# Patient Record
Sex: MALE | Race: OTHER | NOT HISPANIC OR LATINO | URBAN - METROPOLITAN AREA
[De-identification: names, ages, dates, MRNs, and addresses within clinical notes are randomized per-mention and may not be internally consistent; named-entity substitution may affect disease eponyms.]

---

## 2021-04-19 PROBLEM — Z00.00 ENCOUNTER FOR PREVENTIVE HEALTH EXAMINATION: Status: ACTIVE | Noted: 2021-04-19

## 2021-04-20 ENCOUNTER — OUTPATIENT (OUTPATIENT)
Dept: OUTPATIENT SERVICES | Facility: HOSPITAL | Age: 51
LOS: 1 days | End: 2021-04-20
Payer: COMMERCIAL

## 2021-04-20 ENCOUNTER — APPOINTMENT (OUTPATIENT)
Dept: CARDIOTHORACIC SURGERY | Facility: CLINIC | Age: 51
End: 2021-04-20
Payer: MEDICAID

## 2021-04-20 VITALS
HEIGHT: 69 IN | DIASTOLIC BLOOD PRESSURE: 57 MMHG | BODY MASS INDEX: 24.73 KG/M2 | WEIGHT: 167 LBS | HEART RATE: 81 BPM | OXYGEN SATURATION: 96 % | SYSTOLIC BLOOD PRESSURE: 112 MMHG | TEMPERATURE: 98.2 F | RESPIRATION RATE: 17 BRPM

## 2021-04-20 DIAGNOSIS — Z95.5 PRESENCE OF CORONARY ANGIOPLASTY IMPLANT AND GRAFT: ICD-10-CM

## 2021-04-20 DIAGNOSIS — Z82.49 FAMILY HISTORY OF ISCHEMIC HEART DISEASE AND OTHER DISEASES OF THE CIRCULATORY SYSTEM: ICD-10-CM

## 2021-04-20 DIAGNOSIS — Z86.39 PERSONAL HISTORY OF OTHER ENDOCRINE, NUTRITIONAL AND METABOLIC DISEASE: ICD-10-CM

## 2021-04-20 DIAGNOSIS — Z86.79 PERSONAL HISTORY OF OTHER DISEASES OF THE CIRCULATORY SYSTEM: ICD-10-CM

## 2021-04-20 DIAGNOSIS — I25.10 ATHEROSCLEROTIC HEART DISEASE OF NATIVE CORONARY ARTERY W/OUT ANGINA PECTORIS: ICD-10-CM

## 2021-04-20 LAB
A1C WITH ESTIMATED AVERAGE GLUCOSE RESULT: 8.7 % — HIGH (ref 4–5.6)
ALBUMIN SERPL ELPH-MCNC: 4.5 G/DL — SIGNIFICANT CHANGE UP (ref 3.3–5)
ALP SERPL-CCNC: 73 U/L — SIGNIFICANT CHANGE UP (ref 40–120)
ALT FLD-CCNC: 32 U/L — SIGNIFICANT CHANGE UP (ref 10–45)
ANION GAP SERPL CALC-SCNC: 12 MMOL/L — SIGNIFICANT CHANGE UP (ref 5–17)
APPEARANCE UR: CLEAR — SIGNIFICANT CHANGE UP
APTT BLD: 32.1 SEC — SIGNIFICANT CHANGE UP (ref 27.5–35.5)
AST SERPL-CCNC: 52 U/L — HIGH (ref 10–40)
BASOPHILS # BLD AUTO: 0.04 K/UL — SIGNIFICANT CHANGE UP (ref 0–0.2)
BASOPHILS NFR BLD AUTO: 0.5 % — SIGNIFICANT CHANGE UP (ref 0–2)
BILIRUB SERPL-MCNC: 0.5 MG/DL — SIGNIFICANT CHANGE UP (ref 0.2–1.2)
BILIRUB UR-MCNC: NEGATIVE — SIGNIFICANT CHANGE UP
BLD GP AB SCN SERPL QL: NEGATIVE — SIGNIFICANT CHANGE UP
BUN SERPL-MCNC: 13 MG/DL — SIGNIFICANT CHANGE UP (ref 7–23)
CALCIUM SERPL-MCNC: 10 MG/DL — SIGNIFICANT CHANGE UP (ref 8.4–10.5)
CHLORIDE SERPL-SCNC: 101 MMOL/L — SIGNIFICANT CHANGE UP (ref 96–108)
CHOLEST SERPL-MCNC: 107 MG/DL — SIGNIFICANT CHANGE UP
CO2 SERPL-SCNC: 26 MMOL/L — SIGNIFICANT CHANGE UP (ref 22–31)
COLOR SPEC: YELLOW — SIGNIFICANT CHANGE UP
CREAT SERPL-MCNC: 0.84 MG/DL — SIGNIFICANT CHANGE UP (ref 0.5–1.3)
DIFF PNL FLD: NEGATIVE — SIGNIFICANT CHANGE UP
EOSINOPHIL # BLD AUTO: 0.2 K/UL — SIGNIFICANT CHANGE UP (ref 0–0.5)
EOSINOPHIL NFR BLD AUTO: 2.5 % — SIGNIFICANT CHANGE UP (ref 0–6)
ESTIMATED AVERAGE GLUCOSE: 203 MG/DL — HIGH (ref 68–114)
GLUCOSE SERPL-MCNC: 225 MG/DL — HIGH (ref 70–99)
GLUCOSE UR QL: 500
HBV SURFACE AG SER-ACNC: SIGNIFICANT CHANGE UP
HCT VFR BLD CALC: 45.8 % — SIGNIFICANT CHANGE UP (ref 39–50)
HDLC SERPL-MCNC: 38 MG/DL — LOW
HGB BLD-MCNC: 15 G/DL — SIGNIFICANT CHANGE UP (ref 13–17)
IMM GRANULOCYTES NFR BLD AUTO: 0.3 % — SIGNIFICANT CHANGE UP (ref 0–1.5)
INR BLD: 0.95 — SIGNIFICANT CHANGE UP (ref 0.88–1.16)
KETONES UR-MCNC: NEGATIVE — SIGNIFICANT CHANGE UP
LEUKOCYTE ESTERASE UR-ACNC: NEGATIVE — SIGNIFICANT CHANGE UP
LIPID PNL WITH DIRECT LDL SERPL: 39 MG/DL — SIGNIFICANT CHANGE UP
LYMPHOCYTES # BLD AUTO: 1.51 K/UL — SIGNIFICANT CHANGE UP (ref 1–3.3)
LYMPHOCYTES # BLD AUTO: 19.2 % — SIGNIFICANT CHANGE UP (ref 13–44)
MCHC RBC-ENTMCNC: 28.5 PG — SIGNIFICANT CHANGE UP (ref 27–34)
MCHC RBC-ENTMCNC: 32.8 GM/DL — SIGNIFICANT CHANGE UP (ref 32–36)
MCV RBC AUTO: 87.1 FL — SIGNIFICANT CHANGE UP (ref 80–100)
MONOCYTES # BLD AUTO: 0.77 K/UL — SIGNIFICANT CHANGE UP (ref 0–0.9)
MONOCYTES NFR BLD AUTO: 9.8 % — SIGNIFICANT CHANGE UP (ref 2–14)
NEUTROPHILS # BLD AUTO: 5.34 K/UL — SIGNIFICANT CHANGE UP (ref 1.8–7.4)
NEUTROPHILS NFR BLD AUTO: 67.7 % — SIGNIFICANT CHANGE UP (ref 43–77)
NITRITE UR-MCNC: NEGATIVE — SIGNIFICANT CHANGE UP
NON HDL CHOLESTEROL: 69 MG/DL — SIGNIFICANT CHANGE UP
NRBC # BLD: 0 /100 WBCS — SIGNIFICANT CHANGE UP (ref 0–0)
PH UR: 6.5 — SIGNIFICANT CHANGE UP (ref 5–8)
PLATELET # BLD AUTO: 183 K/UL — SIGNIFICANT CHANGE UP (ref 150–400)
POTASSIUM SERPL-MCNC: 4.1 MMOL/L — SIGNIFICANT CHANGE UP (ref 3.5–5.3)
POTASSIUM SERPL-SCNC: 4.1 MMOL/L — SIGNIFICANT CHANGE UP (ref 3.5–5.3)
PROT SERPL-MCNC: 7.5 G/DL — SIGNIFICANT CHANGE UP (ref 6–8.3)
PROT UR-MCNC: NEGATIVE MG/DL — SIGNIFICANT CHANGE UP
PROTHROM AB SERPL-ACNC: 11.4 SEC — SIGNIFICANT CHANGE UP (ref 10.6–13.6)
RBC # BLD: 5.26 M/UL — SIGNIFICANT CHANGE UP (ref 4.2–5.8)
RBC # FLD: 12.8 % — SIGNIFICANT CHANGE UP (ref 10.3–14.5)
RH IG SCN BLD-IMP: POSITIVE — SIGNIFICANT CHANGE UP
SODIUM SERPL-SCNC: 139 MMOL/L — SIGNIFICANT CHANGE UP (ref 135–145)
SP GR SPEC: 1.02 — SIGNIFICANT CHANGE UP (ref 1–1.03)
TRIGL SERPL-MCNC: 149 MG/DL — SIGNIFICANT CHANGE UP
TSH SERPL-MCNC: 1.92 UIU/ML — SIGNIFICANT CHANGE UP (ref 0.35–4.94)
UROBILINOGEN FLD QL: 0.2 E.U./DL — SIGNIFICANT CHANGE UP
WBC # BLD: 7.88 K/UL — SIGNIFICANT CHANGE UP (ref 3.8–10.5)
WBC # FLD AUTO: 7.88 K/UL — SIGNIFICANT CHANGE UP (ref 3.8–10.5)

## 2021-04-20 PROCEDURE — 80061 LIPID PANEL: CPT

## 2021-04-20 PROCEDURE — 36415 COLL VENOUS BLD VENIPUNCTURE: CPT

## 2021-04-20 PROCEDURE — 87340 HEPATITIS B SURFACE AG IA: CPT

## 2021-04-20 PROCEDURE — 81003 URINALYSIS AUTO W/O SCOPE: CPT

## 2021-04-20 PROCEDURE — 86850 RBC ANTIBODY SCREEN: CPT

## 2021-04-20 PROCEDURE — 93000 ELECTROCARDIOGRAM COMPLETE: CPT

## 2021-04-20 PROCEDURE — 86900 BLOOD TYPING SEROLOGIC ABO: CPT

## 2021-04-20 PROCEDURE — 99244 OFF/OP CNSLTJ NEW/EST MOD 40: CPT

## 2021-04-20 PROCEDURE — 99072 ADDL SUPL MATRL&STAF TM PHE: CPT

## 2021-04-20 PROCEDURE — 85730 THROMBOPLASTIN TIME PARTIAL: CPT

## 2021-04-20 PROCEDURE — 71046 X-RAY EXAM CHEST 2 VIEWS: CPT

## 2021-04-20 PROCEDURE — 85610 PROTHROMBIN TIME: CPT

## 2021-04-20 PROCEDURE — 80053 COMPREHEN METABOLIC PANEL: CPT

## 2021-04-20 PROCEDURE — 71046 X-RAY EXAM CHEST 2 VIEWS: CPT | Mod: 26

## 2021-04-20 PROCEDURE — 83036 HEMOGLOBIN GLYCOSYLATED A1C: CPT

## 2021-04-20 PROCEDURE — 86901 BLOOD TYPING SEROLOGIC RH(D): CPT

## 2021-04-20 PROCEDURE — 85025 COMPLETE CBC W/AUTO DIFF WBC: CPT

## 2021-04-20 PROCEDURE — 84443 ASSAY THYROID STIM HORMONE: CPT

## 2021-04-21 PROBLEM — Z82.49 FAMILY HISTORY OF CORONARY ARTERY DISEASE: Status: ACTIVE | Noted: 2021-04-21

## 2021-04-21 NOTE — HISTORY OF PRESENT ILLNESS
[FreeTextEntry1] : 50 yo male, Jakob/English speaking, presents with PMH of HTN, HLD, DM and family history of premature CAD(brother) with 3 vessel CAD. Patient initially presented in 2014 with class III angina, s/p cardiac cath by that revealed 3 vessel CAD, s/p PCI/TAINA->mid lad. On 2/3/15 s/p stage PCI/TAINA-> mid LCX and mid RCA, patent mid LAD stent. He presented to his cardiologist, Dr. Timmy Reynolds with new onset chest pain at rest and on exertion with accompanying HUBER over the past 2 weeks.  CCS class III. 9/29/20 Echo  revealed normal EF, no valvular disease.  4/9/21 + nuclear stress test. 4/19/21 s/p Cardiac cath that revealed severe 3 vessel with ISR. EF 45%. \par \par He presents today for surgical consult with Dr. Mehta accompanied by his brother. He appears generally well, NAD.

## 2021-04-21 NOTE — DATA REVIEWED
[FreeTextEntry1] : 4/20/21 EKG: SR, 76 bpm\par \par 4/20/21 Chest xray ; clear lungs\par \par 4/19/21 Cardiac cath: \par 3 vessel CAD\par 85% prox LAD, 40% mid LAD ISR, 50% distal LAD\par 75% prox LCX, 50% OM1, 70% OM2, 100% OM3 ISR\par 100%  mid RCA ISR\par \par 9/29/20 Echo: EF 55-65%, no valvular disease\par

## 2021-04-21 NOTE — PHYSICAL EXAM
[General Appearance - Alert] : alert [General Appearance - In No Acute Distress] : in no acute distress [Sclera] : the sclera and conjunctiva were normal [PERRL With Normal Accommodation] : pupils were equal in size, round, and reactive to light [Extraocular Movements] : extraocular movements were intact [Outer Ear] : the ears and nose were normal in appearance [Neck Appearance] : the appearance of the neck was normal [Oropharynx] : the oropharynx was normal [Neck Cervical Mass (___cm)] : no neck mass was observed [Jugular Venous Distention Increased] : there was no jugular-venous distention [Thyroid Diffuse Enlargement] : the thyroid was not enlarged [Thyroid Nodule] : there were no palpable thyroid nodules [Auscultation Breath Sounds / Voice Sounds] : lungs were clear to auscultation bilaterally [Heart Rate And Rhythm] : heart rate was normal and rhythm regular [Heart Sounds] : normal S1 and S2 [Heart Sounds Gallop] : no gallops [Murmurs] : no murmurs [Heart Sounds Pericardial Friction Rub] : no pericardial rub [Examination Of The Chest] : the chest was normal in appearance [Chest Visual Inspection Thoracic Asymmetry] : no chest asymmetry [Diminished Respiratory Excursion] : normal chest expansion [2+] : left 2+ [No Abnormalities] : the abdominal aorta was not enlarged and no bruit was heard [Bowel Sounds] : normal bowel sounds [Abdomen Soft] : soft [Abdomen Tenderness] : non-tender [Abdomen Mass (___ Cm)] : no abdominal mass palpated [No CVA Tenderness] : no ~M costovertebral angle tenderness [No Spinal Tenderness] : no spinal tenderness [Abnormal Walk] : normal gait [Nail Clubbing] : no clubbing  or cyanosis of the fingernails [Musculoskeletal - Swelling] : no joint swelling seen [Motor Tone] : muscle strength and tone were normal [Skin Color & Pigmentation] : normal skin color and pigmentation [Skin Turgor] : normal skin turgor [] : no rash [Deep Tendon Reflexes (DTR)] : deep tendon reflexes were 2+ and symmetric [Sensation] : the sensory exam was normal to light touch and pinprick [No Focal Deficits] : no focal deficits [Oriented To Time, Place, And Person] : oriented to person, place, and time [Impaired Insight] : insight and judgment were intact [Affect] : the affect was normal [Right Carotid Bruit] : no bruit heard over the right carotid [Left Carotid Bruit] : no bruit heard over the left carotid [Right Femoral Bruit] : no bruit heard over the right femoral artery [Left Femoral Bruit] : no bruit heard over the left femoral artery

## 2021-04-21 NOTE — ASSESSMENT
[FreeTextEntry1] : 51 year old male  with a history of HTN, HLD, DM and CAD s/p PCI presents with class III w/ severe 3 vessel CAD. Dr. Mehta reviewed the cardiac cath imaging and reports with the patient and his brother and discussed the case with Dr. Timmy Reynolds.  Dr. Mehta discussed the risks, benefits and alternatives to surgery. Risks include but not limited to death, heart attack, bleeding, stroke, kidney problems and infection. He quoted a 1% operative mortality and complication risk.  He also discussed the various approaches, minimally invasive versus sternotomy. Dr. Mehta feels the patient will benefit and is a candidate for a off pump CABG.  All questions were addressed. He will call us to schedule. \par \par Plan: \par PST today-->labs, EKG, pa/lat chest xray\par covid test within 3 days of admission\par SDA\par patient instructed to take metoprolol on morning of surgery\par instructions provided re antibacterial showers and pt given 3 sponges\par pt instructed on use of the incentive spirometer\par

## 2021-04-21 NOTE — REVIEW OF SYSTEMS
[Chest Pain] : chest pain [SOB on Exertion] : shortness of breath during exertion [Negative] : Heme/Lymph

## 2021-04-21 NOTE — END OF VISIT
[Time Spent: ___ minutes] : I have spent [unfilled] minutes of time on the encounter. [FreeTextEntry3] : I, ADOLPH ODONNELL , am scribing for and in the presence of BUZZ ALLISON the following sections: History of present illness, past Medical/family/surgical/family/social history, review of systems, vital signs, physical exam and disposition.\par I personally performed the services described in the documentation, reviewed the documentation recorded by the scribe in my presence and it accurately and completely records my words and actions.\par

## 2021-04-21 NOTE — CONSULT LETTER
[Dear  ___] : Dear  [unfilled], [Please see my note below.] : Please see my note below. [Sincerely,] : Sincerely, [FreeTextEntry2] : Timmy Reynolds M.D. [FreeTextEntry1] : Please find attached our consultation on your patient, EMILY KENYON \par We take a multidisciplinary team approach to patient care and consider you, the referring physician, an extension of our team. We will maintain an open line of communication with you throughout your patient's treatment course.  \par It is our commitment to provide your patient with the highest quality of advanced therapeutic options. We thank you for allowing us to participate in the care of your patient.\par Please do not hesitate to contact our team with any questions or concerns at [phone].\par  [FreeTextEntry3] : Elliott Mehta MD, FRCS\par \par [facility] \par Department of Cardiothoracic  Surgery \par Director of Robotic Cardiac Surgery\par Professor of Cardiovascular & Thoracic Surgery\par [facility] [facility] [employer] \par \par [facility]\par

## 2021-04-28 ENCOUNTER — NON-APPOINTMENT (OUTPATIENT)
Age: 51
End: 2021-04-28

## 2021-04-29 VITALS
OXYGEN SATURATION: 96 % | SYSTOLIC BLOOD PRESSURE: 112 MMHG | TEMPERATURE: 98 F | HEIGHT: 69 IN | WEIGHT: 166.89 LBS | RESPIRATION RATE: 18 BRPM | HEART RATE: 81 BPM | DIASTOLIC BLOOD PRESSURE: 57 MMHG

## 2021-04-29 NOTE — H&P ADULT - HISTORY OF PRESENT ILLNESS
52 yo male, Jakob/English speaking, presents with PMH of HTN, HLD, DM and family history of premature CAD(brother) with 3 vessel CAD. Patient initially presented in 2014 with class III angina, s/p cardiac cath by that revealed 3 vessel CAD, s/p PCI/TAINA->mid lad. On 2/3/15 s/p stage PCI/TAINA-> mid LCX and mid RCA, patent mid LAD stent. He recently presented to his cardiologist, Dr.Gagandeep Reynolds with new onset chest pain at rest and on exertion with accompanying HUBER over the past 2 weeks. CCS class III. 9/29/20 Echo revealed normal EF, no valvular disease. 4/9/21 + nuclear stress test. 4/19/21 s/p Cardiac cath that revealed severe 3 vessel with ISR. EF 45%.     Patient seen by Dr. Mehta in outpatient office and now presents for elective surgery.    52 yo male, Jakob/English speaking, presents with PMH of HTN, HLD, DM and family history of premature CAD(brother) with 3 vessel CAD. Patient initially presented in 2014 with class III angina, s/p cardiac cath by that revealed 3 vessel CAD, s/p PCI/TAINA->mid lad. On 2/3/15 s/p stage PCI/TAINA-> mid LCX and mid RCA, patent mid LAD stent. He recently presented to his cardiologist, Dr.Gagandeep Reynolds with new onset chest pain at rest and on exertion with accompanying HUBER over the past 2 weeks. CCS class III. 9/29/20 Echo revealed normal EF, no valvular disease. 4/9/21 + nuclear stress test. 4/19/21 s/p Cardiac cath that revealed severe 3 vessel with ISR. EF 45%.     Patient seen by Dr. Mehta in outpatient office and now presents for elective surgery. He took his metoprolol this morning. He has been NPO since yesterday. He has no allergies. Plan to proceed with CABG.

## 2021-04-29 NOTE — H&P ADULT - NSHPLABSRESULTS_GEN_ALL_CORE
Hgb A1C = 8.7  creat = 0.84  hct= 45.8  hgb= 15  plt= 183  WBC= 7.88  INR= 0.95  tot alb= 4.5  tot bili= 0.5    TSH= 1.915    4/20/21 EKG: SR, 76 bpm    4/20/21 Chest xray ; clear lungs    4/19/21 Cardiac cath:   3 vessel CAD  85% prox LAD, 40% mid LAD ISR, 50% distal LAD  75% prox LCX, 50% OM1, 70% OM2, 100% OM3 ISR  100% mid RCA ISR    9/29/20 Echo: EF 55-65%, no valvular disease

## 2021-04-29 NOTE — H&P ADULT - ASSESSMENT
51 year old male with a history of HTN, HLD, DM and CAD s/p PCI presents with class III w/ severe 3 vessel CAD. Dr. Mehta reviewed the cardiac cath imaging and reports with the patient and his brother and discussed the case with Dr. Timmy Reynolds. Dr. Mehta discussed the risks, benefits and alternatives to surgery. Risks include but not limited to death, heart attack, bleeding, stroke, kidney problems and infection. He quoted a 1% operative mortality and complication risk. He also discussed the various approaches, minimally invasive versus sternotomy. Dr. Mehta feels the patient will benefit and is a candidate for off pump CABG. All questions were addressed.     Plan:   PST   covid test 4/30, results in HIE  SDA, 5/3  patient instructed to take metoprolol on morning of surgery  instructions provided re antibacterial showers and pt given 3 sponges  pt instructed on use of the incentive spirometer   51 year old male with a history of HTN, HLD, DM and CAD s/p PCI presents with class III w/ severe 3 vessel CAD. Dr. Mehta reviewed the cardiac cath imaging and reports with the patient and his brother and discussed the case with Dr. Timmy Reynolds. Dr. Mehta discussed the risks, benefits and alternatives to surgery. Risks include but not limited to death, heart attack, bleeding, stroke, kidney problems and infection. He quoted a 1% operative mortality and complication risk. He also discussed the various approaches, minimally invasive versus sternotomy. Dr. Mehta feels the patient will benefit and is a candidate for off pump CABG. All questions were addressed.     Plan:   PST   covid test 4/30, results in HIE  SDA, 5/3  patient instructed to take metoprolol on morning of surgery  instructions provided re antibacterial showers and pt given 3 sponges  pt instructed on use of the incentive spirometer    Plan to proceed with CABG.

## 2021-04-29 NOTE — H&P ADULT - NSICDXPASTMEDICALHX_GEN_ALL_CORE_FT
PAST MEDICAL HISTORY:  Essential hypertension Hypertension    Hyperlipidemia Hyperlipidemia    Presence of stent in LAD coronary artery     Presence of stent in right coronary artery     Type 2 diabetes mellitus Diabetes

## 2021-04-30 ENCOUNTER — APPOINTMENT (OUTPATIENT)
Dept: DISASTER EMERGENCY | Facility: CLINIC | Age: 51
End: 2021-04-30

## 2021-04-30 NOTE — PRE-OP CHECKLIST - SELECT TESTS ORDERED
Left Cardiac Cath, ECHO,/CBC/CMP/PT/PTT/INR/Type and Screen/Urinalysis/EKG/CXR/COVID-19 Left Cardiac Cath, ECHO,/CBC/CMP/PT/PTT/INR/Type and Screen/Urinalysis/EKG/CXR/POCT Blood Glucose/COVID-19

## 2021-05-01 LAB — SARS-COV-2 N GENE NPH QL NAA+PROBE: NOT DETECTED

## 2021-05-02 ENCOUNTER — TRANSCRIPTION ENCOUNTER (OUTPATIENT)
Age: 51
End: 2021-05-02

## 2021-05-03 ENCOUNTER — INPATIENT (INPATIENT)
Facility: HOSPITAL | Age: 51
LOS: 2 days | Discharge: HOME HEALTH | DRG: 236 | End: 2021-05-06
Attending: THORACIC SURGERY (CARDIOTHORACIC VASCULAR SURGERY) | Admitting: THORACIC SURGERY (CARDIOTHORACIC VASCULAR SURGERY)
Payer: COMMERCIAL

## 2021-05-03 ENCOUNTER — APPOINTMENT (OUTPATIENT)
Dept: CARDIOTHORACIC SURGERY | Facility: HOSPITAL | Age: 51
End: 2021-05-03

## 2021-05-03 LAB
ALBUMIN SERPL ELPH-MCNC: 3.2 G/DL — LOW (ref 3.3–5)
ALBUMIN SERPL ELPH-MCNC: 3.6 G/DL — SIGNIFICANT CHANGE UP (ref 3.3–5)
ALBUMIN SERPL ELPH-MCNC: 3.6 G/DL — SIGNIFICANT CHANGE UP (ref 3.3–5)
ALP SERPL-CCNC: 41 U/L — SIGNIFICANT CHANGE UP (ref 40–120)
ALP SERPL-CCNC: 53 U/L — SIGNIFICANT CHANGE UP (ref 40–120)
ALP SERPL-CCNC: 55 U/L — SIGNIFICANT CHANGE UP (ref 40–120)
ALT FLD-CCNC: 13 U/L — SIGNIFICANT CHANGE UP (ref 10–45)
ALT FLD-CCNC: 16 U/L — SIGNIFICANT CHANGE UP (ref 10–45)
ALT FLD-CCNC: 17 U/L — SIGNIFICANT CHANGE UP (ref 10–45)
ANION GAP SERPL CALC-SCNC: 10 MMOL/L — SIGNIFICANT CHANGE UP (ref 5–17)
ANION GAP SERPL CALC-SCNC: 10 MMOL/L — SIGNIFICANT CHANGE UP (ref 5–17)
ANION GAP SERPL CALC-SCNC: 8 MMOL/L — SIGNIFICANT CHANGE UP (ref 5–17)
APTT BLD: 26.7 SEC — LOW (ref 27.5–35.5)
APTT BLD: 27.4 SEC — LOW (ref 27.5–35.5)
APTT BLD: 28.7 SEC — SIGNIFICANT CHANGE UP (ref 27.5–35.5)
AST SERPL-CCNC: 16 U/L — SIGNIFICANT CHANGE UP (ref 10–40)
AST SERPL-CCNC: 16 U/L — SIGNIFICANT CHANGE UP (ref 10–40)
AST SERPL-CCNC: 20 U/L — SIGNIFICANT CHANGE UP (ref 10–40)
BASOPHILS # BLD AUTO: 0.05 K/UL — SIGNIFICANT CHANGE UP (ref 0–0.2)
BASOPHILS NFR BLD AUTO: 0.4 % — SIGNIFICANT CHANGE UP (ref 0–2)
BILIRUB SERPL-MCNC: 0.4 MG/DL — SIGNIFICANT CHANGE UP (ref 0.2–1.2)
BILIRUB SERPL-MCNC: 0.5 MG/DL — SIGNIFICANT CHANGE UP (ref 0.2–1.2)
BILIRUB SERPL-MCNC: 0.7 MG/DL — SIGNIFICANT CHANGE UP (ref 0.2–1.2)
BLD GP AB SCN SERPL QL: NEGATIVE — SIGNIFICANT CHANGE UP
BUN SERPL-MCNC: 8 MG/DL — SIGNIFICANT CHANGE UP (ref 7–23)
BUN SERPL-MCNC: 8 MG/DL — SIGNIFICANT CHANGE UP (ref 7–23)
BUN SERPL-MCNC: 9 MG/DL — SIGNIFICANT CHANGE UP (ref 7–23)
CALCIUM SERPL-MCNC: 8.3 MG/DL — LOW (ref 8.4–10.5)
CALCIUM SERPL-MCNC: 8.5 MG/DL — SIGNIFICANT CHANGE UP (ref 8.4–10.5)
CALCIUM SERPL-MCNC: 8.6 MG/DL — SIGNIFICANT CHANGE UP (ref 8.4–10.5)
CHLORIDE SERPL-SCNC: 101 MMOL/L — SIGNIFICANT CHANGE UP (ref 96–108)
CHLORIDE SERPL-SCNC: 103 MMOL/L — SIGNIFICANT CHANGE UP (ref 96–108)
CHLORIDE SERPL-SCNC: 104 MMOL/L — SIGNIFICANT CHANGE UP (ref 96–108)
CO2 SERPL-SCNC: 24 MMOL/L — SIGNIFICANT CHANGE UP (ref 22–31)
CO2 SERPL-SCNC: 25 MMOL/L — SIGNIFICANT CHANGE UP (ref 22–31)
CO2 SERPL-SCNC: 26 MMOL/L — SIGNIFICANT CHANGE UP (ref 22–31)
CREAT SERPL-MCNC: 0.68 MG/DL — SIGNIFICANT CHANGE UP (ref 0.5–1.3)
CREAT SERPL-MCNC: 0.72 MG/DL — SIGNIFICANT CHANGE UP (ref 0.5–1.3)
CREAT SERPL-MCNC: 0.9 MG/DL — SIGNIFICANT CHANGE UP (ref 0.5–1.3)
EOSINOPHIL # BLD AUTO: 0.13 K/UL — SIGNIFICANT CHANGE UP (ref 0–0.5)
EOSINOPHIL NFR BLD AUTO: 0.9 % — SIGNIFICANT CHANGE UP (ref 0–6)
GAS PNL BLDA: SIGNIFICANT CHANGE UP
GLUCOSE BLDC GLUCOMTR-MCNC: 109 MG/DL — HIGH (ref 70–99)
GLUCOSE BLDC GLUCOMTR-MCNC: 129 MG/DL — HIGH (ref 70–99)
GLUCOSE BLDC GLUCOMTR-MCNC: 166 MG/DL — HIGH (ref 70–99)
GLUCOSE BLDC GLUCOMTR-MCNC: 169 MG/DL — HIGH (ref 70–99)
GLUCOSE BLDC GLUCOMTR-MCNC: 175 MG/DL — HIGH (ref 70–99)
GLUCOSE BLDC GLUCOMTR-MCNC: 177 MG/DL — HIGH (ref 70–99)
GLUCOSE BLDC GLUCOMTR-MCNC: 184 MG/DL — HIGH (ref 70–99)
GLUCOSE BLDC GLUCOMTR-MCNC: 184 MG/DL — HIGH (ref 70–99)
GLUCOSE BLDC GLUCOMTR-MCNC: 188 MG/DL — HIGH (ref 70–99)
GLUCOSE BLDC GLUCOMTR-MCNC: 209 MG/DL — HIGH (ref 70–99)
GLUCOSE BLDC GLUCOMTR-MCNC: 98 MG/DL — SIGNIFICANT CHANGE UP (ref 70–99)
GLUCOSE SERPL-MCNC: 121 MG/DL — HIGH (ref 70–99)
GLUCOSE SERPL-MCNC: 181 MG/DL — HIGH (ref 70–99)
GLUCOSE SERPL-MCNC: 216 MG/DL — HIGH (ref 70–99)
HCT VFR BLD CALC: 30.2 % — LOW (ref 39–50)
HCT VFR BLD CALC: 36.3 % — LOW (ref 39–50)
HCT VFR BLD CALC: 36.8 % — LOW (ref 39–50)
HGB BLD-MCNC: 10.3 G/DL — LOW (ref 13–17)
HGB BLD-MCNC: 12.3 G/DL — LOW (ref 13–17)
HGB BLD-MCNC: 12.4 G/DL — LOW (ref 13–17)
IMM GRANULOCYTES NFR BLD AUTO: 0.7 % — SIGNIFICANT CHANGE UP (ref 0–1.5)
INR BLD: 1.11 — SIGNIFICANT CHANGE UP (ref 0.88–1.16)
INR BLD: 1.19 — HIGH (ref 0.88–1.16)
INR BLD: 1.22 — HIGH (ref 0.88–1.16)
LYMPHOCYTES # BLD AUTO: 1.43 K/UL — SIGNIFICANT CHANGE UP (ref 1–3.3)
LYMPHOCYTES # BLD AUTO: 10.3 % — LOW (ref 13–44)
MAGNESIUM SERPL-MCNC: 1.3 MG/DL — LOW (ref 1.6–2.6)
MAGNESIUM SERPL-MCNC: 1.8 MG/DL — SIGNIFICANT CHANGE UP (ref 1.6–2.6)
MAGNESIUM SERPL-MCNC: 2.5 MG/DL — SIGNIFICANT CHANGE UP (ref 1.6–2.6)
MCHC RBC-ENTMCNC: 29 PG — SIGNIFICANT CHANGE UP (ref 27–34)
MCHC RBC-ENTMCNC: 29.1 PG — SIGNIFICANT CHANGE UP (ref 27–34)
MCHC RBC-ENTMCNC: 29.4 PG — SIGNIFICANT CHANGE UP (ref 27–34)
MCHC RBC-ENTMCNC: 33.7 GM/DL — SIGNIFICANT CHANGE UP (ref 32–36)
MCHC RBC-ENTMCNC: 33.9 GM/DL — SIGNIFICANT CHANGE UP (ref 32–36)
MCHC RBC-ENTMCNC: 34.1 GM/DL — SIGNIFICANT CHANGE UP (ref 32–36)
MCV RBC AUTO: 86 FL — SIGNIFICANT CHANGE UP (ref 80–100)
MCV RBC AUTO: 86 FL — SIGNIFICANT CHANGE UP (ref 80–100)
MCV RBC AUTO: 86.3 FL — SIGNIFICANT CHANGE UP (ref 80–100)
MONOCYTES # BLD AUTO: 1.13 K/UL — HIGH (ref 0–0.9)
MONOCYTES NFR BLD AUTO: 8.1 % — SIGNIFICANT CHANGE UP (ref 2–14)
NEUTROPHILS # BLD AUTO: 11.09 K/UL — HIGH (ref 1.8–7.4)
NEUTROPHILS NFR BLD AUTO: 79.6 % — HIGH (ref 43–77)
NRBC # BLD: 0 /100 WBCS — SIGNIFICANT CHANGE UP (ref 0–0)
PHOSPHATE SERPL-MCNC: 3 MG/DL — SIGNIFICANT CHANGE UP (ref 2.5–4.5)
PHOSPHATE SERPL-MCNC: 4 MG/DL — SIGNIFICANT CHANGE UP (ref 2.5–4.5)
PHOSPHATE SERPL-MCNC: 4.9 MG/DL — HIGH (ref 2.5–4.5)
PLATELET # BLD AUTO: 137 K/UL — LOW (ref 150–400)
PLATELET # BLD AUTO: 148 K/UL — LOW (ref 150–400)
PLATELET # BLD AUTO: 156 K/UL — SIGNIFICANT CHANGE UP (ref 150–400)
POTASSIUM SERPL-MCNC: 4 MMOL/L — SIGNIFICANT CHANGE UP (ref 3.5–5.3)
POTASSIUM SERPL-MCNC: 4.2 MMOL/L — SIGNIFICANT CHANGE UP (ref 3.5–5.3)
POTASSIUM SERPL-MCNC: 4.3 MMOL/L — SIGNIFICANT CHANGE UP (ref 3.5–5.3)
POTASSIUM SERPL-SCNC: 4 MMOL/L — SIGNIFICANT CHANGE UP (ref 3.5–5.3)
POTASSIUM SERPL-SCNC: 4.2 MMOL/L — SIGNIFICANT CHANGE UP (ref 3.5–5.3)
POTASSIUM SERPL-SCNC: 4.3 MMOL/L — SIGNIFICANT CHANGE UP (ref 3.5–5.3)
PROT SERPL-MCNC: 5.2 G/DL — LOW (ref 6–8.3)
PROT SERPL-MCNC: 5.5 G/DL — LOW (ref 6–8.3)
PROT SERPL-MCNC: 5.7 G/DL — LOW (ref 6–8.3)
PROTHROM AB SERPL-ACNC: 13.2 SEC — SIGNIFICANT CHANGE UP (ref 10.6–13.6)
PROTHROM AB SERPL-ACNC: 14.2 SEC — HIGH (ref 10.6–13.6)
PROTHROM AB SERPL-ACNC: 14.5 SEC — HIGH (ref 10.6–13.6)
RBC # BLD: 3.5 M/UL — LOW (ref 4.2–5.8)
RBC # BLD: 4.22 M/UL — SIGNIFICANT CHANGE UP (ref 4.2–5.8)
RBC # BLD: 4.28 M/UL — SIGNIFICANT CHANGE UP (ref 4.2–5.8)
RBC # FLD: 12.2 % — SIGNIFICANT CHANGE UP (ref 10.3–14.5)
RBC # FLD: 12.3 % — SIGNIFICANT CHANGE UP (ref 10.3–14.5)
RBC # FLD: 12.5 % — SIGNIFICANT CHANGE UP (ref 10.3–14.5)
RH IG SCN BLD-IMP: POSITIVE — SIGNIFICANT CHANGE UP
SODIUM SERPL-SCNC: 135 MMOL/L — SIGNIFICANT CHANGE UP (ref 135–145)
SODIUM SERPL-SCNC: 138 MMOL/L — SIGNIFICANT CHANGE UP (ref 135–145)
SODIUM SERPL-SCNC: 138 MMOL/L — SIGNIFICANT CHANGE UP (ref 135–145)
WBC # BLD: 12.01 K/UL — HIGH (ref 3.8–10.5)
WBC # BLD: 13.93 K/UL — HIGH (ref 3.8–10.5)
WBC # BLD: 9.18 K/UL — SIGNIFICANT CHANGE UP (ref 3.8–10.5)
WBC # FLD AUTO: 12.01 K/UL — HIGH (ref 3.8–10.5)
WBC # FLD AUTO: 13.93 K/UL — HIGH (ref 3.8–10.5)
WBC # FLD AUTO: 9.18 K/UL — SIGNIFICANT CHANGE UP (ref 3.8–10.5)

## 2021-05-03 PROCEDURE — 99291 CRITICAL CARE FIRST HOUR: CPT

## 2021-05-03 PROCEDURE — 99292 CRITICAL CARE ADDL 30 MIN: CPT

## 2021-05-03 PROCEDURE — 33534 CABG ARTERIAL TWO: CPT | Mod: AS

## 2021-05-03 PROCEDURE — 76998 US GUIDE INTRAOP: CPT | Mod: 26,59

## 2021-05-03 PROCEDURE — 33534 CABG ARTERIAL TWO: CPT

## 2021-05-03 PROCEDURE — 71045 X-RAY EXAM CHEST 1 VIEW: CPT | Mod: 26,77

## 2021-05-03 PROCEDURE — 76998 US GUIDE INTRAOP: CPT | Mod: 26,AS,59

## 2021-05-03 PROCEDURE — 71045 X-RAY EXAM CHEST 1 VIEW: CPT | Mod: 26

## 2021-05-03 PROCEDURE — 93010 ELECTROCARDIOGRAM REPORT: CPT

## 2021-05-03 RX ORDER — INSULIN HUMAN 100 [IU]/ML
1 INJECTION, SOLUTION SUBCUTANEOUS
Qty: 50 | Refills: 0 | Status: DISCONTINUED | OUTPATIENT
Start: 2021-05-03 | End: 2021-05-04

## 2021-05-03 RX ORDER — ALBUMIN HUMAN 25 %
250 VIAL (ML) INTRAVENOUS
Refills: 0 | Status: COMPLETED | OUTPATIENT
Start: 2021-05-03 | End: 2021-05-03

## 2021-05-03 RX ORDER — CHLORHEXIDINE GLUCONATE 213 G/1000ML
1 SOLUTION TOPICAL DAILY
Refills: 0 | Status: DISCONTINUED | OUTPATIENT
Start: 2021-05-03 | End: 2021-05-04

## 2021-05-03 RX ORDER — CLOPIDOGREL BISULFATE 75 MG/1
75 TABLET, FILM COATED ORAL DAILY
Refills: 0 | Status: DISCONTINUED | OUTPATIENT
Start: 2021-05-03 | End: 2021-05-06

## 2021-05-03 RX ORDER — MAGNESIUM SULFATE 500 MG/ML
2 VIAL (ML) INJECTION ONCE
Refills: 0 | Status: COMPLETED | OUTPATIENT
Start: 2021-05-03 | End: 2021-05-03

## 2021-05-03 RX ORDER — ATORVASTATIN CALCIUM 80 MG/1
80 TABLET, FILM COATED ORAL AT BEDTIME
Refills: 0 | Status: DISCONTINUED | OUTPATIENT
Start: 2021-05-03 | End: 2021-05-06

## 2021-05-03 RX ORDER — OXYCODONE AND ACETAMINOPHEN 5; 325 MG/1; MG/1
2 TABLET ORAL EVERY 6 HOURS
Refills: 0 | Status: DISCONTINUED | OUTPATIENT
Start: 2021-05-03 | End: 2021-05-06

## 2021-05-03 RX ORDER — DEXTROSE 50 % IN WATER 50 %
25 SYRINGE (ML) INTRAVENOUS
Refills: 0 | Status: DISCONTINUED | OUTPATIENT
Start: 2021-05-03 | End: 2021-05-04

## 2021-05-03 RX ORDER — POLYETHYLENE GLYCOL 3350 17 G/17G
17 POWDER, FOR SOLUTION ORAL DAILY
Refills: 0 | Status: DISCONTINUED | OUTPATIENT
Start: 2021-05-03 | End: 2021-05-06

## 2021-05-03 RX ORDER — PANTOPRAZOLE SODIUM 20 MG/1
40 TABLET, DELAYED RELEASE ORAL DAILY
Refills: 0 | Status: DISCONTINUED | OUTPATIENT
Start: 2021-05-03 | End: 2021-05-03

## 2021-05-03 RX ORDER — DEXMEDETOMIDINE HYDROCHLORIDE IN 0.9% SODIUM CHLORIDE 4 UG/ML
0.5 INJECTION INTRAVENOUS
Qty: 400 | Refills: 0 | Status: DISCONTINUED | OUTPATIENT
Start: 2021-05-03 | End: 2021-05-03

## 2021-05-03 RX ORDER — CHLORHEXIDINE GLUCONATE 213 G/1000ML
5 SOLUTION TOPICAL
Refills: 0 | Status: DISCONTINUED | OUTPATIENT
Start: 2021-05-03 | End: 2021-05-03

## 2021-05-03 RX ORDER — CEFAZOLIN SODIUM 1 G
2000 VIAL (EA) INJECTION EVERY 8 HOURS
Refills: 0 | Status: COMPLETED | OUTPATIENT
Start: 2021-05-03 | End: 2021-05-04

## 2021-05-03 RX ORDER — CALCIUM GLUCONATE 100 MG/ML
2 VIAL (ML) INTRAVENOUS ONCE
Refills: 0 | Status: COMPLETED | OUTPATIENT
Start: 2021-05-03 | End: 2021-05-03

## 2021-05-03 RX ORDER — HEPARIN SODIUM 5000 [USP'U]/ML
5000 INJECTION INTRAVENOUS; SUBCUTANEOUS EVERY 8 HOURS
Refills: 0 | Status: DISCONTINUED | OUTPATIENT
Start: 2021-05-03 | End: 2021-05-06

## 2021-05-03 RX ORDER — ACETAMINOPHEN 500 MG
1000 TABLET ORAL ONCE
Refills: 0 | Status: COMPLETED | OUTPATIENT
Start: 2021-05-03 | End: 2021-05-03

## 2021-05-03 RX ORDER — ACETAMINOPHEN 500 MG
650 TABLET ORAL EVERY 6 HOURS
Refills: 0 | Status: DISCONTINUED | OUTPATIENT
Start: 2021-05-03 | End: 2021-05-06

## 2021-05-03 RX ORDER — OXYCODONE AND ACETAMINOPHEN 5; 325 MG/1; MG/1
1 TABLET ORAL EVERY 4 HOURS
Refills: 0 | Status: DISCONTINUED | OUTPATIENT
Start: 2021-05-03 | End: 2021-05-06

## 2021-05-03 RX ORDER — FENTANYL CITRATE 50 UG/ML
50 INJECTION INTRAVENOUS ONCE
Refills: 0 | Status: DISCONTINUED | OUTPATIENT
Start: 2021-05-03 | End: 2021-05-03

## 2021-05-03 RX ORDER — PANTOPRAZOLE SODIUM 20 MG/1
40 TABLET, DELAYED RELEASE ORAL
Refills: 0 | Status: DISCONTINUED | OUTPATIENT
Start: 2021-05-03 | End: 2021-05-06

## 2021-05-03 RX ORDER — MEPERIDINE HYDROCHLORIDE 50 MG/ML
25 INJECTION INTRAMUSCULAR; INTRAVENOUS; SUBCUTANEOUS ONCE
Refills: 0 | Status: DISCONTINUED | OUTPATIENT
Start: 2021-05-03 | End: 2021-05-03

## 2021-05-03 RX ORDER — DEXTROSE 50 % IN WATER 50 %
50 SYRINGE (ML) INTRAVENOUS
Refills: 0 | Status: DISCONTINUED | OUTPATIENT
Start: 2021-05-03 | End: 2021-05-04

## 2021-05-03 RX ORDER — FENTANYL CITRATE 50 UG/ML
25 INJECTION INTRAVENOUS ONCE
Refills: 0 | Status: DISCONTINUED | OUTPATIENT
Start: 2021-05-03 | End: 2021-05-03

## 2021-05-03 RX ORDER — NOREPINEPHRINE BITARTRATE/D5W 8 MG/250ML
0.05 PLASTIC BAG, INJECTION (ML) INTRAVENOUS
Qty: 8 | Refills: 0 | Status: DISCONTINUED | OUTPATIENT
Start: 2021-05-03 | End: 2021-05-04

## 2021-05-03 RX ORDER — CHLORHEXIDINE GLUCONATE 213 G/1000ML
15 SOLUTION TOPICAL EVERY 12 HOURS
Refills: 0 | Status: DISCONTINUED | OUTPATIENT
Start: 2021-05-03 | End: 2021-05-03

## 2021-05-03 RX ORDER — SODIUM CHLORIDE 9 MG/ML
1000 INJECTION INTRAMUSCULAR; INTRAVENOUS; SUBCUTANEOUS
Refills: 0 | Status: DISCONTINUED | OUTPATIENT
Start: 2021-05-03 | End: 2021-05-04

## 2021-05-03 RX ORDER — ASPIRIN/CALCIUM CARB/MAGNESIUM 324 MG
81 TABLET ORAL DAILY
Refills: 0 | Status: DISCONTINUED | OUTPATIENT
Start: 2021-05-03 | End: 2021-05-06

## 2021-05-03 RX ORDER — PROPOFOL 10 MG/ML
10 INJECTION, EMULSION INTRAVENOUS
Qty: 1000 | Refills: 0 | Status: DISCONTINUED | OUTPATIENT
Start: 2021-05-03 | End: 2021-05-03

## 2021-05-03 RX ORDER — CEFAZOLIN SODIUM 1 G
2000 VIAL (EA) INJECTION EVERY 8 HOURS
Refills: 0 | Status: DISCONTINUED | OUTPATIENT
Start: 2021-05-03 | End: 2021-05-03

## 2021-05-03 RX ADMIN — Medication 200 GRAM(S): at 18:00

## 2021-05-03 RX ADMIN — INSULIN HUMAN 1 UNIT(S)/HR: 100 INJECTION, SOLUTION SUBCUTANEOUS at 12:57

## 2021-05-03 RX ADMIN — Medication 50 GRAM(S): at 23:09

## 2021-05-03 RX ADMIN — PANTOPRAZOLE SODIUM 40 MILLIGRAM(S): 20 TABLET, DELAYED RELEASE ORAL at 12:56

## 2021-05-03 RX ADMIN — Medication 400 MILLIGRAM(S): at 18:19

## 2021-05-03 RX ADMIN — FENTANYL CITRATE 25 MICROGRAM(S): 50 INJECTION INTRAVENOUS at 12:15

## 2021-05-03 RX ADMIN — CHLORHEXIDINE GLUCONATE 1 APPLICATION(S): 213 SOLUTION TOPICAL at 12:57

## 2021-05-03 RX ADMIN — Medication 2000 MILLIGRAM(S): at 21:24

## 2021-05-03 RX ADMIN — Medication 50 GRAM(S): at 12:56

## 2021-05-03 RX ADMIN — Medication 250 MILLILITER(S): at 21:28

## 2021-05-03 RX ADMIN — OXYCODONE AND ACETAMINOPHEN 2 TABLET(S): 5; 325 TABLET ORAL at 22:31

## 2021-05-03 RX ADMIN — Medication 250 MILLILITER(S): at 21:29

## 2021-05-03 RX ADMIN — Medication 200 GRAM(S): at 22:38

## 2021-05-03 RX ADMIN — OXYCODONE AND ACETAMINOPHEN 2 TABLET(S): 5; 325 TABLET ORAL at 23:00

## 2021-05-03 RX ADMIN — Medication 2000 MILLIGRAM(S): at 16:16

## 2021-05-03 RX ADMIN — Medication 1000 MILLIGRAM(S): at 19:05

## 2021-05-03 RX ADMIN — HEPARIN SODIUM 5000 UNIT(S): 5000 INJECTION INTRAVENOUS; SUBCUTANEOUS at 14:21

## 2021-05-03 RX ADMIN — FENTANYL CITRATE 50 MICROGRAM(S): 50 INJECTION INTRAVENOUS at 20:00

## 2021-05-03 RX ADMIN — FENTANYL CITRATE 25 MICROGRAM(S): 50 INJECTION INTRAVENOUS at 12:00

## 2021-05-03 RX ADMIN — FENTANYL CITRATE 50 MICROGRAM(S): 50 INJECTION INTRAVENOUS at 19:45

## 2021-05-03 RX ADMIN — DEXMEDETOMIDINE HYDROCHLORIDE IN 0.9% SODIUM CHLORIDE 9.46 MICROGRAM(S)/KG/HR: 4 INJECTION INTRAVENOUS at 12:57

## 2021-05-03 RX ADMIN — ATORVASTATIN CALCIUM 80 MILLIGRAM(S): 80 TABLET, FILM COATED ORAL at 21:18

## 2021-05-03 NOTE — BRIEF OPERATIVE NOTE - OPERATION/FINDINGS
OPCABx2 (LIMA to LAD, RA to OM)  EF 50% OPCABx2 (LIMA to LAD, RA to OM)  Proximal anastomoses with side biter and 2-0 punch  EF 50%

## 2021-05-03 NOTE — BRIEF OPERATIVE NOTE - NSICDXBRIEFPROCEDURE_GEN_ALL_CORE_FT
PROCEDURES:  OPCAB (off-pump coronary artery bypass) 03-May-2021 11:31:13 HAAS to LAD, RA to Roxie Bryson

## 2021-05-03 NOTE — PROGRESS NOTE ADULT - SUBJECTIVE AND OBJECTIVE BOX
CTICU  CRITICAL  CARE  attending     Hand off received 					   Pertinent clinical, laboratory, radiographic, hemodynamic, echocardiographic, respiratory data, microbiologic data and chart were reviewed and analyzed frequently throughout the course of the day and night  Patient seen and examined with CTS/ SH attending at bedside    operative day today: CABG x 2V; LIMA; and Left Radial art graft; Off pump  EF reportedly normal  IVF: 3L crystalloid    received intubated/sedated  ETT adjusted at lip line  Waking up non focal  Labs/CXR reviewed and appropriate changes made  Weaning to extubate    HPI:    52 yo male, Jakob/English speaking, presents with PMH of HTN, HLD, DM and family history of premature CAD(brother) with 3 vessel CAD. Patient initially presented in 2014 with class III angina, s/p cardiac cath by that revealed 3 vessel CAD, s/p PCI/TAINA->mid lad. On 2/3/15 s/p stage PCI/TAINA-> mid LCX and mid RCA, patent mid LAD stent. He recently presented to his cardiologist, Dr.Gagandeep Reynolds with new onset chest pain at rest and on exertion with accompanying HUBER over the past 2 weeks. CCS class III. 9/29/20 Echo revealed normal EF, no valvular disease. 4/9/21 + nuclear stress test. 4/19/21 s/p Cardiac cath that revealed severe 3 vessel with ISR. EF 45%.         , FAMILY HISTORY:  Family history of cardiovascular disease  Family history of heart disease    PAST MEDICAL & SURGICAL HISTORY:  Type 2 diabetes mellitus  Diabetes    Hyperlipidemia  Hyperlipidemia    Essential hypertension  Hypertension    Presence of stent in LAD coronary artery    Presence of stent in right coronary artery      Patient is a 51y old  Male who presents with a chief complaint of CAD (29 Apr 2021 16:34)      14 system review was unremarkable    Vital signs, hemodynamic and respiratory parameters were reviewed from the bedside nursing flowsheet.  ICU Vital Signs Last 24 Hrs  T(C): --  T(F): --  HR: 86 (03 May 2021 13:15) (86 - 104)  BP: --  BP(mean): --  ABP: 131/63 (03 May 2021 13:15) (120/58 - 159/67)  ABP(mean): 84 (03 May 2021 13:15) (80 - 98)  RR: 12 (03 May 2021 13:30) (12 - 12)  SpO2: 100% (03 May 2021 13:15) (100% - 100%)    Adult Advanced Hemodynamics Last 24 Hrs  CVP(mm Hg): 4 (03 May 2021 13:00) (2 - 7)  CVP(cm H2O): --  CO: --  CI: --  PA: --  PA(mean): --  PCWP: --  SVR: --  SVRI: --  PVR: --  PVRI: --, ABG - ( 03 May 2021 11:37 )  pH, Arterial: 7.43  pH, Blood: x     /  pCO2: 35    /  pO2: 320   / HCO3: 23    / Base Excess: -0.7  /  SaO2: 98.3              Mode: AC/ CMV (Assist Control/ Continuous Mandatory Ventilation)  RR (machine): 12  TV (machine): 500  FiO2: 40  PEEP: 5  ITime: 1  MAP: 8  PIP: 18    Intake and output was reviewed and the fluid balance was calculated  Daily     Daily   I&O's Summary    03 May 2021 07:01  -  03 May 2021 13:24  --------------------------------------------------------  IN: 110.2 mL / OUT: 375 mL / NET: -264.8 mL        All lines and drain sites were assessed  [facility]      POCT Blood Glucose.: 166 mg/dL (03 May 2021 05:57)    No acute change in mental status  Auscultation of the chest reveals equal bs  Abdomen is soft  Extremities are warm and well perfused  Wounds appear clean and unremarkable  Antibiotics are periop    labs  CBC Full  -  ( 03 May 2021 12:11 )  WBC Count : 13.93 K/uL  RBC Count : 4.22 M/uL  Hemoglobin : 12.3 g/dL  Hematocrit : 36.3 %  Platelet Count - Automated : 148 K/uL  Mean Cell Volume : 86.0 fl  Mean Cell Hemoglobin : 29.1 pg  Mean Cell Hemoglobin Concentration : 33.9 gm/dL  Auto Neutrophil # : 11.09 K/uL  Auto Lymphocyte # : 1.43 K/uL  Auto Monocyte # : 1.13 K/uL  Auto Eosinophil # : 0.13 K/uL  Auto Basophil # : 0.05 K/uL  Auto Neutrophil % : 79.6 %  Auto Lymphocyte % : 10.3 %  Auto Monocyte % : 8.1 %  Auto Eosinophil % : 0.9 %  Auto Basophil % : 0.4 %    05-03    138  |  104  |  8   ----------------------------<  181<H>  4.3   |  24  |  0.68    Ca    8.5      03 May 2021 12:11  Phos  3.0     05-03  Mg     1.3     05-03    TPro  5.2<L>  /  Alb  3.2<L>  /  TBili  0.5  /  DBili  x   /  AST  16  /  ALT  16  /  AlkPhos  55  05-03    PT/INR - ( 03 May 2021 12:11 )   PT: 14.5 sec;   INR: 1.22          PTT - ( 03 May 2021 12:11 )  PTT:26.7 sec  The current medications were reviewed   MEDICATIONS  (STANDING):  aspirin enteric coated 81 milliGRAM(s) Oral daily  atorvastatin 80 milliGRAM(s) Oral at bedtime  ceFAZolin  Injectable. 2000 milliGRAM(s) IV Push every 8 hours  chlorhexidine 0.12% Liquid 5 milliLiter(s) Oral Mucosa two times a day  chlorhexidine 2% Cloths 1 Application(s) Topical daily  clopidogrel Tablet 75 milliGRAM(s) Oral daily  dexMEDEtomidine Infusion 0.5 MICROgram(s)/kG/Hr (9.46 mL/Hr) IV Continuous <Continuous>  dextrose 50% Injectable 50 milliLiter(s) IV Push every 15 minutes  dextrose 50% Injectable 25 milliLiter(s) IV Push every 15 minutes  heparin   Injectable 5000 Unit(s) SubCutaneous every 8 hours  insulin regular Infusion 1 Unit(s)/Hr (1 mL/Hr) IV Continuous <Continuous>  meperidine     Injectable 25 milliGRAM(s) IV Push once  norepinephrine Infusion 0.05 MICROgram(s)/kG/Min (7.09 mL/Hr) IV Continuous <Continuous>  pantoprazole  Injectable 40 milliGRAM(s) IV Push daily  propofol Infusion 10 MICROgram(s)/kG/Min (4.54 mL/Hr) IV Continuous <Continuous>  sodium chloride 0.9%. 1000 milliLiter(s) (10 mL/Hr) IV Continuous <Continuous>    MEDICATIONS  (PRN):       PROBLEM LIST/ ASSESSMENT:  HEALTH ISSUES - PROBLEM Dx:      ,   Patient is a 51y old  Male who presents with a chief complaint of CAD (29 Apr 2021 16:34)     s/p CABG      My plan includes :    CV:    maintain sinus rhythm  maintain MAP >60 but less than 80  Epicardial pacemaker on VVI as backup    Pulm:    stable on the vent  serial ABG's to assess adequacy of ventilation/oxygenation and readiness for weaning and extubation      close hemodynamic, ventilatory and drain monitoring and management per post op routine    Monitor for arrhythmias and monitor parameters for organ perfusion  monitor neurologic status  Head of the bed should remain elevated to 45 deg .   chest PT and IS will be encouraged  monitor adequacy of oxygenation and ventilation and attempt to wean oxygen  Nutritional goals will be met using po eventually , ensure adequate caloric intake and montior the same  Stress ulcer and VTE prophylaxis will be achieved    Glycemic control is satisfactory  Electrolytes have been repleted as necessary and wound care has been carried out. Pain control has been achieved.   agressive physical therapy and early mobility and ambulation goals will be met   The family was updated about the course and plan  CRITICAL CARE TIME SPENT in evaluation and management, reassessments, review and interpretation of labs and x-rays, ventilator and hemodynamic management, formulating a plan and coordinating care: ___90____ MIN.  Time does not include procedural time.  CTICU ATTENDING     					    Memo Schultz MD

## 2021-05-03 NOTE — BRIEF OPERATIVE NOTE - COMMENTS
I first assisted for the entirety of the case, including but not limited to conduit harvest, distal/proximal anastamoses, and chest closure.

## 2021-05-03 NOTE — PROGRESS NOTE ADULT - SUBJECTIVE AND OBJECTIVE BOX
CTICU  CRITICAL  CARE  attending     Hand off received 					   Pertinent clinical, laboratory, radiographic, hemodynamic, echocardiographic, respiratory data, microbiologic data and chart were reviewed and analyzed frequently throughout the course of the day and night    51 years old male with DM, HTN, HLD,  and family history of premature CAD(brother) with 3 vessel CAD.   The patient initially diagnosed  in 2014 with  3 vessel CAD, s/p PCI/TAINA->mid lad. On 2/3/15 s/p stage PCI/TAINA-> mid LCX and mid RCA, patent mid LAD stent.   He recently presented to his cardiologist, Dr.Gagandeep Reynolds with new onset chest pain at rest and on exertion with accompanying HUBER over the past 2 weeks.  Echo revealed normal EF, no valvular disease. 4/9/21 + nuclear stress test.   Cardiac cath that revealed severe 3 vessel with ISR. EF 45%.     S/P Off Pump CABG x 2.      FAMILY HISTORY:  Family history of cardiovascular disease  Family history of heart disease    PAST MEDICAL & SURGICAL HISTORY:  Type 2 Diabetes Mellitis.  Hyperlipidemia  Essential hypertension  Presence of stent in LAD coronary artery  Presence of stent in right coronary artery          14 system review was unremarkable      Vital signs, hemodynamic and respiratory parameters were reviewed from the bedside nursing flow sheet.  ICU Vital Signs Last 24 Hrs  T(C): 36.7 (03 May 2021 22:42), Max: 36.7 (03 May 2021 22:42)  T(F): 98 (03 May 2021 22:42), Max: 98 (03 May 2021 22:42)  HR: 103 (03 May 2021 23:00) (76 - 104)  BP: 101/57 (03 May 2021 17:00) (101/57 - 101/57)  BP(mean): 73 (03 May 2021 17:00) (73 - 73)  ABP: 118/52 (03 May 2021 23:00) (100/54 - 159/67)  ABP(mean): 71 (03 May 2021 23:00) (69 - 98)  RR: 16 (03 May 2021 23:00) (12 - 18)  SpO2: 100% (03 May 2021 23:00) (98% - 100%)    Adult Advanced Hemodynamics Last 24 Hrs  CVP(mm Hg): 4 (03 May 2021 23:00) (1 - 11)  CVP(cm H2O): --  CO: --  CI: --  PA: --  PA(mean): --  PCWP: --  SVR: --  SVRI: --  PVR: --  PVRI: --, ABG - ( 03 May 2021 21:42 )  pH, Arterial: 7.37  pH, Blood: x     /  pCO2: 41    /  pO2: 161   / HCO3: 24    / Base Excess: -1.5  /  SaO2: 98.4              Mode: AC/ CMV (Assist Control/ Continuous Mandatory Ventilation)  RR (machine): 12  TV (machine): 500  FiO2: 40  PEEP: 5  ITime: 1  MAP: 8  PIP: 18    Intake and output was reviewed and the fluid balance was calculated  Daily     Daily   I&O's Summary    03 May 2021 07:01  -  03 May 2021 23:38  --------------------------------------------------------  IN: 2439.2 mL / OUT: 1715 mL / NET: 724.2 mL        All lines and drain sites were assessed    Neuro: Follows commands. Moves all 4 extremities.  Neck: No JVD.  CVS: S1, S2, No S3.  Lungs: Good air entry bilaterally.  Abd: Soft. No tenderness. + Bowel sounds.  Vascular: + DP/PT.  Extremities: No edema.  Lymphatic: Normal.  Skin: No abnormalities.      labs  CBC Full  -  ( 03 May 2021 21:39 )  WBC Count : 9.18 K/uL  RBC Count : 3.50 M/uL  Hemoglobin : 10.3 g/dL  Hematocrit : 30.2 %  Platelet Count - Automated : 137 K/uL  Mean Cell Volume : 86.3 fl  Mean Cell Hemoglobin : 29.4 pg  Mean Cell Hemoglobin Concentration : 34.1 gm/dL  Auto Neutrophil # : x  Auto Lymphocyte # : x  Auto Monocyte # : x  Auto Eosinophil # : x  Auto Basophil # : x  Auto Neutrophil % : x  Auto Lymphocyte % : x  Auto Monocyte % : x  Auto Eosinophil % : x  Auto Basophil % : x    05-03    135  |  101  |  9   ----------------------------<  216<H>  4.2   |  26  |  0.90    Ca    8.3<L>      03 May 2021 21:39  Phos  4.9     05-03  Mg     1.8     05-03    TPro  5.5<L>  /  Alb  3.6  /  TBili  0.7  /  DBili  x   /  AST  16  /  ALT  13  /  AlkPhos  41  05-03    PT/INR - ( 03 May 2021 21:39 )   PT: 14.2 sec;   INR: 1.19          PTT - ( 03 May 2021 21:39 )  PTT:28.7 sec  The current medications were reviewed   MEDICATIONS  (STANDING):  aspirin enteric coated 81 milliGRAM(s) Oral daily  atorvastatin 80 milliGRAM(s) Oral at bedtime  ceFAZolin  Injectable. 2000 milliGRAM(s) IV Push every 8 hours  chlorhexidine 2% Cloths 1 Application(s) Topical daily  clopidogrel Tablet 75 milliGRAM(s) Oral daily  dextrose 50% Injectable 50 milliLiter(s) IV Push every 15 minutes  dextrose 50% Injectable 25 milliLiter(s) IV Push every 15 minutes  heparin   Injectable 5000 Unit(s) SubCutaneous every 8 hours  insulin regular Infusion 1 Unit(s)/Hr (1 mL/Hr) IV Continuous <Continuous>  norepinephrine Infusion 0.05 MICROgram(s)/kG/Min (7.09 mL/Hr) IV Continuous <Continuous>  pantoprazole    Tablet 40 milliGRAM(s) Oral before breakfast  polyethylene glycol 3350 17 Gram(s) Oral daily  sodium chloride 0.9%. 1000 milliLiter(s) (10 mL/Hr) IV Continuous <Continuous>    MEDICATIONS  (PRN):  acetaminophen   Tablet .. 650 milliGRAM(s) Oral every 6 hours PRN Mild Pain (1 - 3)  oxycodone    5 mG/acetaminophen 325 mG 1 Tablet(s) Oral every 4 hours PRN Moderate Pain (4 - 6)  oxycodone    5 mG/acetaminophen 325 mG 2 Tablet(s) Oral every 6 hours PRN Severe Pain (7 - 10)             51 years old male with CAD.  S/P Off Pump CABG x 2.   LIMA to LAD. RA to OM.  Hemodynamically stable.  Good oxygenation.  Fair urine out put.        My plan includes :  D/C Norepinephrine.  Statin and Betablocker.  Close hemodynamic, ventilatory and drain monitoring and management  Monitor for arrhythmias and monitor parameters for organ perfusion  Monitor neurologic status  Monitor renal function.  Head of the bed should remain elevated to 45 deg .   Chest PT and IS will be encouraged  Monitor adequacy of oxygenation and ventilation and attempt to wean oxygen  Nutritional goals will be met using po eventually , ensure adequate caloric intake and monitor the same  Stress ulcer and VTE prophylaxis will be achieved    Glycemic control is satisfactory  Electrolytes have been repleted as necessary and wound care has been carried out. Pain control has been achieved.   Aggressive physical therapy and early mobility and ambulation goals will be met   The family was updated about the course and plan  CRITICAL CARE TIME SPENT in evaluation and management, reassessments, review and interpretation of labs and x-rays, ventilator and hemodynamic management, formulating a plan and coordinating care: ___30____ MIN.  Time does not include procedural time.  CTICU ATTENDING     					    Jason Neal MD

## 2021-05-04 LAB
ALBUMIN SERPL ELPH-MCNC: 3.8 G/DL — SIGNIFICANT CHANGE UP (ref 3.3–5)
ALP SERPL-CCNC: 35 U/L — LOW (ref 40–120)
ALT FLD-CCNC: 10 U/L — SIGNIFICANT CHANGE UP (ref 10–45)
ANION GAP SERPL CALC-SCNC: 9 MMOL/L — SIGNIFICANT CHANGE UP (ref 5–17)
ANION GAP SERPL CALC-SCNC: 9 MMOL/L — SIGNIFICANT CHANGE UP (ref 5–17)
APTT BLD: 29.6 SEC — SIGNIFICANT CHANGE UP (ref 27.5–35.5)
APTT BLD: 33.9 SEC — SIGNIFICANT CHANGE UP (ref 27.5–35.5)
AST SERPL-CCNC: 15 U/L — SIGNIFICANT CHANGE UP (ref 10–40)
BILIRUB SERPL-MCNC: 0.7 MG/DL — SIGNIFICANT CHANGE UP (ref 0.2–1.2)
BUN SERPL-MCNC: 10 MG/DL — SIGNIFICANT CHANGE UP (ref 7–23)
BUN SERPL-MCNC: 10 MG/DL — SIGNIFICANT CHANGE UP (ref 7–23)
CALCIUM SERPL-MCNC: 8.4 MG/DL — SIGNIFICANT CHANGE UP (ref 8.4–10.5)
CALCIUM SERPL-MCNC: 8.5 MG/DL — SIGNIFICANT CHANGE UP (ref 8.4–10.5)
CHLORIDE SERPL-SCNC: 101 MMOL/L — SIGNIFICANT CHANGE UP (ref 96–108)
CHLORIDE SERPL-SCNC: 103 MMOL/L — SIGNIFICANT CHANGE UP (ref 96–108)
CO2 SERPL-SCNC: 26 MMOL/L — SIGNIFICANT CHANGE UP (ref 22–31)
CO2 SERPL-SCNC: 27 MMOL/L — SIGNIFICANT CHANGE UP (ref 22–31)
CREAT SERPL-MCNC: 0.8 MG/DL — SIGNIFICANT CHANGE UP (ref 0.5–1.3)
CREAT SERPL-MCNC: 0.87 MG/DL — SIGNIFICANT CHANGE UP (ref 0.5–1.3)
GAS PNL BLDA: SIGNIFICANT CHANGE UP
GAS PNL BLDA: SIGNIFICANT CHANGE UP
GLUCOSE BLDC GLUCOMTR-MCNC: 100 MG/DL — HIGH (ref 70–99)
GLUCOSE BLDC GLUCOMTR-MCNC: 117 MG/DL — HIGH (ref 70–99)
GLUCOSE BLDC GLUCOMTR-MCNC: 125 MG/DL — HIGH (ref 70–99)
GLUCOSE BLDC GLUCOMTR-MCNC: 128 MG/DL — HIGH (ref 70–99)
GLUCOSE BLDC GLUCOMTR-MCNC: 133 MG/DL — HIGH (ref 70–99)
GLUCOSE BLDC GLUCOMTR-MCNC: 135 MG/DL — HIGH (ref 70–99)
GLUCOSE BLDC GLUCOMTR-MCNC: 135 MG/DL — HIGH (ref 70–99)
GLUCOSE BLDC GLUCOMTR-MCNC: 147 MG/DL — HIGH (ref 70–99)
GLUCOSE BLDC GLUCOMTR-MCNC: 150 MG/DL — HIGH (ref 70–99)
GLUCOSE BLDC GLUCOMTR-MCNC: 165 MG/DL — HIGH (ref 70–99)
GLUCOSE BLDC GLUCOMTR-MCNC: 213 MG/DL — HIGH (ref 70–99)
GLUCOSE BLDC GLUCOMTR-MCNC: 79 MG/DL — SIGNIFICANT CHANGE UP (ref 70–99)
GLUCOSE SERPL-MCNC: 119 MG/DL — HIGH (ref 70–99)
GLUCOSE SERPL-MCNC: 231 MG/DL — HIGH (ref 70–99)
HCT VFR BLD CALC: 27.5 % — LOW (ref 39–50)
HCT VFR BLD CALC: 29.2 % — LOW (ref 39–50)
HGB BLD-MCNC: 9.4 G/DL — LOW (ref 13–17)
HGB BLD-MCNC: 9.9 G/DL — LOW (ref 13–17)
INR BLD: 1.25 — HIGH (ref 0.88–1.16)
INR BLD: 1.4 — HIGH (ref 0.88–1.16)
MAGNESIUM SERPL-MCNC: 1.7 MG/DL — SIGNIFICANT CHANGE UP (ref 1.6–2.6)
MAGNESIUM SERPL-MCNC: 2.1 MG/DL — SIGNIFICANT CHANGE UP (ref 1.6–2.6)
MCHC RBC-ENTMCNC: 29.1 PG — SIGNIFICANT CHANGE UP (ref 27–34)
MCHC RBC-ENTMCNC: 29.1 PG — SIGNIFICANT CHANGE UP (ref 27–34)
MCHC RBC-ENTMCNC: 33.9 GM/DL — SIGNIFICANT CHANGE UP (ref 32–36)
MCHC RBC-ENTMCNC: 34.2 GM/DL — SIGNIFICANT CHANGE UP (ref 32–36)
MCV RBC AUTO: 85.1 FL — SIGNIFICANT CHANGE UP (ref 80–100)
MCV RBC AUTO: 85.9 FL — SIGNIFICANT CHANGE UP (ref 80–100)
NRBC # BLD: 0 /100 WBCS — SIGNIFICANT CHANGE UP (ref 0–0)
NRBC # BLD: 0 /100 WBCS — SIGNIFICANT CHANGE UP (ref 0–0)
PHOSPHATE SERPL-MCNC: 3.5 MG/DL — SIGNIFICANT CHANGE UP (ref 2.5–4.5)
PHOSPHATE SERPL-MCNC: 3.6 MG/DL — SIGNIFICANT CHANGE UP (ref 2.5–4.5)
PLATELET # BLD AUTO: 126 K/UL — LOW (ref 150–400)
PLATELET # BLD AUTO: 155 K/UL — SIGNIFICANT CHANGE UP (ref 150–400)
POTASSIUM SERPL-MCNC: 3.9 MMOL/L — SIGNIFICANT CHANGE UP (ref 3.5–5.3)
POTASSIUM SERPL-MCNC: 4.3 MMOL/L — SIGNIFICANT CHANGE UP (ref 3.5–5.3)
POTASSIUM SERPL-SCNC: 3.9 MMOL/L — SIGNIFICANT CHANGE UP (ref 3.5–5.3)
POTASSIUM SERPL-SCNC: 4.3 MMOL/L — SIGNIFICANT CHANGE UP (ref 3.5–5.3)
PROT SERPL-MCNC: 5.2 G/DL — LOW (ref 6–8.3)
PROTHROM AB SERPL-ACNC: 14.8 SEC — HIGH (ref 10.6–13.6)
PROTHROM AB SERPL-ACNC: 16.5 SEC — HIGH (ref 10.6–13.6)
RBC # BLD: 3.23 M/UL — LOW (ref 4.2–5.8)
RBC # BLD: 3.4 M/UL — LOW (ref 4.2–5.8)
RBC # FLD: 12.5 % — SIGNIFICANT CHANGE UP (ref 10.3–14.5)
RBC # FLD: 12.7 % — SIGNIFICANT CHANGE UP (ref 10.3–14.5)
SODIUM SERPL-SCNC: 137 MMOL/L — SIGNIFICANT CHANGE UP (ref 135–145)
SODIUM SERPL-SCNC: 138 MMOL/L — SIGNIFICANT CHANGE UP (ref 135–145)
WBC # BLD: 11.36 K/UL — HIGH (ref 3.8–10.5)
WBC # BLD: 6.87 K/UL — SIGNIFICANT CHANGE UP (ref 3.8–10.5)
WBC # FLD AUTO: 11.36 K/UL — HIGH (ref 3.8–10.5)
WBC # FLD AUTO: 6.87 K/UL — SIGNIFICANT CHANGE UP (ref 3.8–10.5)

## 2021-05-04 PROCEDURE — 99292 CRITICAL CARE ADDL 30 MIN: CPT

## 2021-05-04 PROCEDURE — 71045 X-RAY EXAM CHEST 1 VIEW: CPT | Mod: 26,77

## 2021-05-04 PROCEDURE — 99291 CRITICAL CARE FIRST HOUR: CPT

## 2021-05-04 PROCEDURE — 71045 X-RAY EXAM CHEST 1 VIEW: CPT | Mod: 26

## 2021-05-04 RX ORDER — DEXTROSE 50 % IN WATER 50 %
25 SYRINGE (ML) INTRAVENOUS ONCE
Refills: 0 | Status: DISCONTINUED | OUTPATIENT
Start: 2021-05-04 | End: 2021-05-06

## 2021-05-04 RX ORDER — INSULIN GLARGINE 100 [IU]/ML
10 INJECTION, SOLUTION SUBCUTANEOUS AT BEDTIME
Refills: 0 | Status: DISCONTINUED | OUTPATIENT
Start: 2021-05-04 | End: 2021-05-04

## 2021-05-04 RX ORDER — DEXTROSE 50 % IN WATER 50 %
25 SYRINGE (ML) INTRAVENOUS ONCE
Refills: 0 | Status: DISCONTINUED | OUTPATIENT
Start: 2021-05-04 | End: 2021-05-04

## 2021-05-04 RX ORDER — POTASSIUM CHLORIDE 20 MEQ
20 PACKET (EA) ORAL
Refills: 0 | Status: COMPLETED | OUTPATIENT
Start: 2021-05-04 | End: 2021-05-04

## 2021-05-04 RX ORDER — INSULIN LISPRO 100/ML
3 VIAL (ML) SUBCUTANEOUS
Refills: 0 | Status: DISCONTINUED | OUTPATIENT
Start: 2021-05-04 | End: 2021-05-04

## 2021-05-04 RX ORDER — KETOROLAC TROMETHAMINE 30 MG/ML
15 SYRINGE (ML) INJECTION ONCE
Refills: 0 | Status: DISCONTINUED | OUTPATIENT
Start: 2021-05-04 | End: 2021-05-04

## 2021-05-04 RX ORDER — HUMAN INSULIN 100 [IU]/ML
5 INJECTION, SUSPENSION SUBCUTANEOUS ONCE
Refills: 0 | Status: COMPLETED | OUTPATIENT
Start: 2021-05-04 | End: 2021-05-04

## 2021-05-04 RX ORDER — METOCLOPRAMIDE HCL 10 MG
10 TABLET ORAL ONCE
Refills: 0 | Status: COMPLETED | OUTPATIENT
Start: 2021-05-04 | End: 2021-05-04

## 2021-05-04 RX ORDER — MAGNESIUM OXIDE 400 MG ORAL TABLET 241.3 MG
800 TABLET ORAL ONCE
Refills: 0 | Status: COMPLETED | OUTPATIENT
Start: 2021-05-04 | End: 2021-05-04

## 2021-05-04 RX ORDER — INSULIN LISPRO 100/ML
VIAL (ML) SUBCUTANEOUS
Refills: 0 | Status: DISCONTINUED | OUTPATIENT
Start: 2021-05-04 | End: 2021-05-06

## 2021-05-04 RX ORDER — ALBUMIN HUMAN 25 %
250 VIAL (ML) INTRAVENOUS
Refills: 0 | Status: COMPLETED | OUTPATIENT
Start: 2021-05-04 | End: 2021-05-04

## 2021-05-04 RX ORDER — DEXTROSE 50 % IN WATER 50 %
12.5 SYRINGE (ML) INTRAVENOUS ONCE
Refills: 0 | Status: DISCONTINUED | OUTPATIENT
Start: 2021-05-04 | End: 2021-05-06

## 2021-05-04 RX ORDER — SODIUM CHLORIDE 9 MG/ML
1000 INJECTION, SOLUTION INTRAVENOUS
Refills: 0 | Status: DISCONTINUED | OUTPATIENT
Start: 2021-05-04 | End: 2021-05-04

## 2021-05-04 RX ORDER — DEXTROSE 50 % IN WATER 50 %
15 SYRINGE (ML) INTRAVENOUS ONCE
Refills: 0 | Status: DISCONTINUED | OUTPATIENT
Start: 2021-05-04 | End: 2021-05-04

## 2021-05-04 RX ORDER — SODIUM CHLORIDE 9 MG/ML
500 INJECTION, SOLUTION INTRAVENOUS ONCE
Refills: 0 | Status: DISCONTINUED | OUTPATIENT
Start: 2021-05-04 | End: 2021-05-04

## 2021-05-04 RX ORDER — INSULIN GLARGINE 100 [IU]/ML
18 INJECTION, SOLUTION SUBCUTANEOUS AT BEDTIME
Refills: 0 | Status: DISCONTINUED | OUTPATIENT
Start: 2021-05-04 | End: 2021-05-06

## 2021-05-04 RX ORDER — SODIUM CHLORIDE 9 MG/ML
3 INJECTION INTRAMUSCULAR; INTRAVENOUS; SUBCUTANEOUS EVERY 8 HOURS
Refills: 0 | Status: DISCONTINUED | OUTPATIENT
Start: 2021-05-04 | End: 2021-05-06

## 2021-05-04 RX ORDER — INSULIN LISPRO 100/ML
6 VIAL (ML) SUBCUTANEOUS
Refills: 0 | Status: DISCONTINUED | OUTPATIENT
Start: 2021-05-04 | End: 2021-05-05

## 2021-05-04 RX ORDER — FUROSEMIDE 40 MG
20 TABLET ORAL ONCE
Refills: 0 | Status: COMPLETED | OUTPATIENT
Start: 2021-05-04 | End: 2021-05-04

## 2021-05-04 RX ORDER — GLUCAGON INJECTION, SOLUTION 0.5 MG/.1ML
1 INJECTION, SOLUTION SUBCUTANEOUS ONCE
Refills: 0 | Status: DISCONTINUED | OUTPATIENT
Start: 2021-05-04 | End: 2021-05-06

## 2021-05-04 RX ADMIN — HEPARIN SODIUM 5000 UNIT(S): 5000 INJECTION INTRAVENOUS; SUBCUTANEOUS at 14:22

## 2021-05-04 RX ADMIN — Medication 3 UNIT(S): at 12:22

## 2021-05-04 RX ADMIN — Medication 650 MILLIGRAM(S): at 22:47

## 2021-05-04 RX ADMIN — Medication 2: at 17:44

## 2021-05-04 RX ADMIN — Medication 20 MILLIGRAM(S): at 06:35

## 2021-05-04 RX ADMIN — SODIUM CHLORIDE 3 MILLILITER(S): 9 INJECTION INTRAMUSCULAR; INTRAVENOUS; SUBCUTANEOUS at 21:48

## 2021-05-04 RX ADMIN — HEPARIN SODIUM 5000 UNIT(S): 5000 INJECTION INTRAVENOUS; SUBCUTANEOUS at 06:35

## 2021-05-04 RX ADMIN — ATORVASTATIN CALCIUM 80 MILLIGRAM(S): 80 TABLET, FILM COATED ORAL at 21:47

## 2021-05-04 RX ADMIN — Medication 650 MILLIGRAM(S): at 23:47

## 2021-05-04 RX ADMIN — Medication 3 UNIT(S): at 07:23

## 2021-05-04 RX ADMIN — Medication 125 MILLILITER(S): at 01:21

## 2021-05-04 RX ADMIN — OXYCODONE AND ACETAMINOPHEN 1 TABLET(S): 5; 325 TABLET ORAL at 15:11

## 2021-05-04 RX ADMIN — POLYETHYLENE GLYCOL 3350 17 GRAM(S): 17 POWDER, FOR SOLUTION ORAL at 10:18

## 2021-05-04 RX ADMIN — OXYCODONE AND ACETAMINOPHEN 1 TABLET(S): 5; 325 TABLET ORAL at 05:34

## 2021-05-04 RX ADMIN — Medication 2000 MILLIGRAM(S): at 06:48

## 2021-05-04 RX ADMIN — Medication 2000 MILLIGRAM(S): at 21:47

## 2021-05-04 RX ADMIN — Medication 81 MILLIGRAM(S): at 10:18

## 2021-05-04 RX ADMIN — HEPARIN SODIUM 5000 UNIT(S): 5000 INJECTION INTRAVENOUS; SUBCUTANEOUS at 21:47

## 2021-05-04 RX ADMIN — Medication 15 MILLIGRAM(S): at 12:10

## 2021-05-04 RX ADMIN — OXYCODONE AND ACETAMINOPHEN 2 TABLET(S): 5; 325 TABLET ORAL at 08:00

## 2021-05-04 RX ADMIN — Medication 3 UNIT(S): at 17:44

## 2021-05-04 RX ADMIN — Medication 1 TABLET(S): at 12:06

## 2021-05-04 RX ADMIN — Medication 15 MILLIGRAM(S): at 10:48

## 2021-05-04 RX ADMIN — HUMAN INSULIN 5 UNIT(S): 100 INJECTION, SUSPENSION SUBCUTANEOUS at 06:23

## 2021-05-04 RX ADMIN — MAGNESIUM OXIDE 400 MG ORAL TABLET 800 MILLIGRAM(S): 241.3 TABLET ORAL at 12:06

## 2021-05-04 RX ADMIN — OXYCODONE AND ACETAMINOPHEN 1 TABLET(S): 5; 325 TABLET ORAL at 07:51

## 2021-05-04 RX ADMIN — Medication 10 MILLIGRAM(S): at 07:23

## 2021-05-04 RX ADMIN — Medication 125 MILLILITER(S): at 04:00

## 2021-05-04 RX ADMIN — CLOPIDOGREL BISULFATE 75 MILLIGRAM(S): 75 TABLET, FILM COATED ORAL at 10:18

## 2021-05-04 RX ADMIN — OXYCODONE AND ACETAMINOPHEN 1 TABLET(S): 5; 325 TABLET ORAL at 16:27

## 2021-05-04 RX ADMIN — OXYCODONE AND ACETAMINOPHEN 2 TABLET(S): 5; 325 TABLET ORAL at 20:30

## 2021-05-04 RX ADMIN — Medication 4: at 12:23

## 2021-05-04 RX ADMIN — OXYCODONE AND ACETAMINOPHEN 1 TABLET(S): 5; 325 TABLET ORAL at 13:00

## 2021-05-04 RX ADMIN — Medication 20 MILLIEQUIVALENT(S): at 08:23

## 2021-05-04 RX ADMIN — OXYCODONE AND ACETAMINOPHEN 1 TABLET(S): 5; 325 TABLET ORAL at 13:01

## 2021-05-04 RX ADMIN — Medication 20 MILLIEQUIVALENT(S): at 05:34

## 2021-05-04 RX ADMIN — INSULIN GLARGINE 18 UNIT(S): 100 INJECTION, SOLUTION SUBCUTANEOUS at 21:47

## 2021-05-04 RX ADMIN — PANTOPRAZOLE SODIUM 40 MILLIGRAM(S): 20 TABLET, DELAYED RELEASE ORAL at 06:35

## 2021-05-04 RX ADMIN — OXYCODONE AND ACETAMINOPHEN 2 TABLET(S): 5; 325 TABLET ORAL at 19:11

## 2021-05-04 RX ADMIN — OXYCODONE AND ACETAMINOPHEN 1 TABLET(S): 5; 325 TABLET ORAL at 06:00

## 2021-05-04 RX ADMIN — Medication 2000 MILLIGRAM(S): at 14:22

## 2021-05-04 NOTE — PROGRESS NOTE ADULT - SUBJECTIVE AND OBJECTIVE BOX
Patient discussed on morning rounds with       Operation / Date:     SUBJECTIVE ASSESSMENT:  51y Male         Vital Signs Last 24 Hrs  T(C): 36.7 (04 May 2021 14:28), Max: 36.7 (03 May 2021 22:42)  T(F): 98 (04 May 2021 14:28), Max: 98 (03 May 2021 22:42)  HR: 94 (04 May 2021 14:00) (83 - 119)  BP: 101/57 (03 May 2021 17:00) (101/57 - 101/57)  BP(mean): 73 (03 May 2021 17:00) (73 - 73)  RR: 16 (04 May 2021 13:00) (12 - 35)  SpO2: 97% (04 May 2021 14:00) (95% - 100%)  I&O's Detail    03 May 2021 07:01  -  04 May 2021 07:00  --------------------------------------------------------  IN:    Albumin 5%  - 250 mL: 1250 mL    Dexmedetomidine: 54 mL    Insulin: 65 mL    IV PiggyBack: 100 mL    IV PiggyBack: 350 mL    Lactated Ringers Bolus: 1250 mL    Norepinephrine: 9.8 mL    Oral Fluid: 620 mL    Propofol: 18.2 mL    sodium chloride 0.9%: 340 mL  Total IN: 4057 mL    OUT:    Bulb (mL): 700 mL    Chest Tube (mL): 230 mL    Indwelling Catheter - Urethral (mL): 2410 mL  Total OUT: 3340 mL    Total NET: 717 mL      04 May 2021 07:01  -  04 May 2021 15:29  --------------------------------------------------------  IN:    Lactated Ringers Bolus: 750 mL    Norepinephrine: 5.6 mL    sodium chloride 0.9%: 60 mL  Total IN: 815.6 mL    OUT:    Bulb (mL): 150 mL    Chest Tube (mL): 70 mL    Indwelling Catheter - Urethral (mL): 1300 mL  Total OUT: 1520 mL    Total NET: -704.4 mL          CHEST TUBE:  Yes/No. AIR LEAKS: Yes/No. Suction / H2O SEAL.   MELANIE DRAIN:  Yes/No.  EPICARDIAL WIRES: Yes/No.  TIE DOWNS: Yes/No.  AL: Yes/No.    PHYSICAL EXAM:    General:     Neurological:    Cardiovascular:    Respiratory:    Gastrointestinal:    Extremities:    Vascular:    Incision Sites:    LABS:                        9.9    11.36 )-----------( 155      ( 04 May 2021 09:04 )             29.2       COUMADIN:  Yes/No. REASON: .    PT/INR - ( 04 May 2021 09:04 )   PT: 16.5 sec;   INR: 1.40          PTT - ( 04 May 2021 09:04 )  PTT:33.9 sec    05-04    138  |  103  |  10  ----------------------------<  231<H>  4.3   |  26  |  0.80    Ca    8.5      04 May 2021 10:43  Phos  3.5     05-04  Mg     1.7     05-04    TPro  5.2<L>  /  Alb  3.8  /  TBili  0.7  /  DBili  x   /  AST  15  /  ALT  10  /  AlkPhos  35<L>  05-04          MEDICATIONS  (STANDING):  aspirin enteric coated 81 milliGRAM(s) Oral daily  atorvastatin 80 milliGRAM(s) Oral at bedtime  ceFAZolin  Injectable. 2000 milliGRAM(s) IV Push every 8 hours  chlorhexidine 2% Cloths 1 Application(s) Topical daily  clopidogrel Tablet 75 milliGRAM(s) Oral daily  dextrose 50% Injectable 12.5 Gram(s) IV Push once  dextrose 50% Injectable 25 Gram(s) IV Push once  glucagon  Injectable 1 milliGRAM(s) IntraMuscular once  heparin   Injectable 5000 Unit(s) SubCutaneous every 8 hours  insulin glargine Injectable (LANTUS) 10 Unit(s) SubCutaneous at bedtime  insulin lispro (ADMELOG) corrective regimen sliding scale   SubCutaneous Before meals and at bedtime  insulin lispro Injectable (ADMELOG) 3 Unit(s) SubCutaneous three times a day before meals  insulin regular Infusion 1 Unit(s)/Hr (1 mL/Hr) IV Continuous <Continuous>  multivitamin 1 Tablet(s) Oral daily  pantoprazole    Tablet 40 milliGRAM(s) Oral before breakfast  polyethylene glycol 3350 17 Gram(s) Oral daily    MEDICATIONS  (PRN):  acetaminophen   Tablet .. 650 milliGRAM(s) Oral every 6 hours PRN Mild Pain (1 - 3)  oxycodone    5 mG/acetaminophen 325 mG 1 Tablet(s) Oral every 4 hours PRN Moderate Pain (4 - 6)  oxycodone    5 mG/acetaminophen 325 mG 2 Tablet(s) Oral every 6 hours PRN Severe Pain (7 - 10)        RADIOLOGY & ADDITIONAL TESTS:     Patient discussed on morning rounds with Dr. Mehta    Operation / Date: OPCABx2 (LIMA to LAD, RA to OM)  EF 50%-         5/3/21      SUBJECTIVE ASSESSMENT:  51y Male year doing well.     Vital Signs Last 24 Hrs  T(C): 36.7 (04 May 2021 14:28), Max: 36.7 (03 May 2021 22:42)  T(F): 98 (04 May 2021 14:28), Max: 98 (03 May 2021 22:42)  HR: 94 (04 May 2021 14:00) (83 - 119) SR  BP: 101/57 (03 May 2021 17:00) (101/57 - 101/57)  BP(mean): 73 (03 May 2021 17:00) (73 - 73)  RR: 16 (04 May 2021 13:00) (12 - 35)  SpO2: 97% (04 May 2021 14:00) (95% - 100%) BIJAN  I&O's Detail    03 May 2021 07:01  -  04 May 2021 07:00  --------------------------------------------------------  IN:    Albumin 5%  - 250 mL: 1250 mL    Dexmedetomidine: 54 mL    Insulin: 65 mL    IV PiggyBack: 100 mL    IV PiggyBack: 350 mL    Lactated Ringers Bolus: 1250 mL    Norepinephrine: 9.8 mL    Oral Fluid: 620 mL    Propofol: 18.2 mL    sodium chloride 0.9%: 340 mL  Total IN: 4057 mL    OUT:    Bulb (mL): 700 mL    Chest Tube (mL): 230 mL    Indwelling Catheter - Urethral (mL): 2410 mL  Total OUT: 3340 mL    Total NET: 717 mL      04 May 2021 07:01  -  04 May 2021 15:29  --------------------------------------------------------  IN:    Lactated Ringers Bolus: 750 mL    Norepinephrine: 5.6 mL    sodium chloride 0.9%: 60 mL  Total IN: 815.6 mL    OUT:    Bulb (mL): 150 mL    Chest Tube (mL): 70 mL    Indwelling Catheter - Urethral (mL): 1300 mL  Total OUT: 1520 mL    Total NET: -704.4 mL      CHEST TUBE: Yes (2 blakes)  EPICARDIAL WIRES: Yes  TIE DOWNS: Yes  AL: No.    PHYSICAL EXAM:    General:  NAD    Neurological: A&O, non focal    Cardiovascular: RRR, no m/r/g    Respiratory: CTABL    Gastrointestinal: + BS, soft non tender    Extremities: warm, no edema    Incision Sites: c/d/i      LABS:                        9.9    11.36 )-----------( 155      ( 04 May 2021 09:04 )             29.2       PT/INR - ( 04 May 2021 09:04 )   PT: 16.5 sec;   INR: 1.40          PTT - ( 04 May 2021 09:04 )  PTT:33.9 sec    05-04    138  |  103  |  10  ----------------------------<  231<H>  4.3   |  26  |  0.80    Ca    8.5      04 May 2021 10:43  Phos  3.5     05-04  Mg     1.7     05-04    TPro  5.2<L>  /  Alb  3.8  /  TBili  0.7  /  DBili  x   /  AST  15  /  ALT  10  /  AlkPhos  35<L>  05-04        MEDICATIONS  (STANDING):  aspirin enteric coated 81 milliGRAM(s) Oral daily  atorvastatin 80 milliGRAM(s) Oral at bedtime  ceFAZolin  Injectable. 2000 milliGRAM(s) IV Push every 8 hours  chlorhexidine 2% Cloths 1 Application(s) Topical daily  clopidogrel Tablet 75 milliGRAM(s) Oral daily  heparin   Injectable 5000 Unit(s) SubCutaneous every 8 hours  insulin glargine Injectable (LANTUS) 10 Unit(s) SubCutaneous at bedtime  insulin lispro (ADMELOG) corrective regimen sliding scale   SubCutaneous Before meals and at bedtime  insulin lispro Injectable (ADMELOG) 3 Unit(s) SubCutaneous three times a day before meals  multivitamin 1 Tablet(s) Oral daily  pantoprazole    Tablet 40 milliGRAM(s) Oral before breakfast  polyethylene glycol 3350 17 Gram(s) Oral daily      MEDICATIONS  (PRN):  acetaminophen   Tablet .. 650 milliGRAM(s) Oral every 6 hours PRN Mild Pain (1 - 3)  oxycodone    5 mG/acetaminophen 325 mG 1 Tablet(s) Oral every 4 hours PRN Moderate Pain (4 - 6)  oxycodone    5 mG/acetaminophen 325 mG 2 Tablet(s) Oral every 6 hours PRN Severe Pain (7 - 10)

## 2021-05-04 NOTE — PROGRESS NOTE ADULT - SUBJECTIVE AND OBJECTIVE BOX
CTICU  CRITICAL  CARE  attending     Hand off received 					   Pertinent clinical, laboratory, radiographic, hemodynamic, echocardiographic, respiratory data, microbiologic data and chart were reviewed and analyzed frequently throughout the course of the day and night        51 years old male with DM, HTN, HLD,  and family history of premature CAD(brother) with 3 vessel CAD.   The patient initially diagnosed  in 2014 with  3 vessel CAD, s/p PCI/TAINA->mid lad. On 2/3/15 s/p stage PCI/TAINA-> mid LCX and mid RCA, patent mid LAD stent.   He recently presented to his cardiologist, Dr.Gagandeep Reynolds with new onset chest pain at rest and on exertion with accompanying HUBER over the past 2 weeks.  Echo revealed normal EF, no valvular disease. 4/9/21 + nuclear stress test.   Cardiac cath that revealed severe 3 vessel with ISR. EF 45%.     S/P Off Pump CABG x 2.    FAMILY HISTORY:  Family history of cardiovascular disease  Family history of heart disease    PAST MEDICAL & SURGICAL HISTORY:  Type 2 diabetes mellitus  Hyperlipidemia  Essential hypertension  Presence of stent in LAD coronary artery  Presence of stent in right coronary artery          14 system review was unremarkable      Vital signs, hemodynamic and respiratory parameters were reviewed from the bedside nursing flow sheet.  ICU Vital Signs Last 24 Hrs  T(C): 36.7 (04 May 2021 17:13), Max: 36.7 (03 May 2021 22:42)  T(F): 98.1 (04 May 2021 17:13), Max: 98.1 (04 May 2021 17:13)  HR: 96 (04 May 2021 20:30) (91 - 119)  BP: 105/57 (04 May 2021 20:30) (105/57 - 105/62)  BP(mean): 77 (04 May 2021 20:30) (77 - 78)  ABP: 106/72 (04 May 2021 16:00) (92/45 - 136/53)  ABP(mean): 72 (04 May 2021 15:00) (58 - 78)  RR: 18 (04 May 2021 20:30) (16 - 35)  SpO2: 98% (04 May 2021 20:30) (95% - 100%)    Adult Advanced Hemodynamics Last 24 Hrs  CVP(mm Hg): 9 (04 May 2021 15:00) (1 - 10)  CVP(cm H2O): --  CO: --  CI: --  PA: --  PA(mean): --  PCWP: --  SVR: --  SVRI: --  PVR: --  PVRI: --, ABG - ( 04 May 2021 08:57 )  pH, Arterial: 7.41  pH, Blood: x     /  pCO2: 42    /  pO2: 81    / HCO3: 27    / Base Excess: 1.7   /  SaO2: 97.0                Intake and output was reviewed and the fluid balance was calculated  Daily     Daily   I&O's Summary    03 May 2021 07:01  -  04 May 2021 07:00  --------------------------------------------------------  IN: 4057 mL / OUT: 3340 mL / NET: 717 mL    04 May 2021 07:01  -  04 May 2021 21:50  --------------------------------------------------------  IN: 815.6 mL / OUT: 1520 mL / NET: -704.4 mL        All lines and drain sites were assessed      Neuro: No focal motor deficit.  Neck: No JVD.  CVS: S1, S2, No S3.  Lungs: Good air entry bilaterally.  Abd: Soft. No tenderness. + Bowel sounds.  Vascular: + DP/PT.  Extremities: No edema.  Lymphatic: Normal.  Skin: No abnormalities.      labs  CBC Full  -  ( 04 May 2021 09:04 )  WBC Count : 11.36 K/uL  RBC Count : 3.40 M/uL  Hemoglobin : 9.9 g/dL  Hematocrit : 29.2 %  Platelet Count - Automated : 155 K/uL  Mean Cell Volume : 85.9 fl  Mean Cell Hemoglobin : 29.1 pg  Mean Cell Hemoglobin Concentration : 33.9 gm/dL  Auto Neutrophil # : x  Auto Lymphocyte # : x  Auto Monocyte # : x  Auto Eosinophil # : x  Auto Basophil # : x  Auto Neutrophil % : x  Auto Lymphocyte % : x  Auto Monocyte % : x  Auto Eosinophil % : x  Auto Basophil % : x    05-04    138  |  103  |  10  ----------------------------<  231<H>  4.3   |  26  |  0.80    Ca    8.5      04 May 2021 10:43  Phos  3.5     05-04  Mg     1.7     05-04    TPro  5.2<L>  /  Alb  3.8  /  TBili  0.7  /  DBili  x   /  AST  15  /  ALT  10  /  AlkPhos  35<L>  05-04    PT/INR - ( 04 May 2021 09:04 )   PT: 16.5 sec;   INR: 1.40          PTT - ( 04 May 2021 09:04 )  PTT:33.9 sec  The current medications were reviewed   MEDICATIONS  (STANDING):  aspirin enteric coated 81 milliGRAM(s) Oral daily  atorvastatin 80 milliGRAM(s) Oral at bedtime  clopidogrel Tablet 75 milliGRAM(s) Oral daily  dextrose 50% Injectable 12.5 Gram(s) IV Push once  dextrose 50% Injectable 25 Gram(s) IV Push once  glucagon  Injectable 1 milliGRAM(s) IntraMuscular once  heparin   Injectable 5000 Unit(s) SubCutaneous every 8 hours  insulin glargine Injectable (LANTUS) 18 Unit(s) SubCutaneous at bedtime  insulin lispro (ADMELOG) corrective regimen sliding scale   SubCutaneous Before meals and at bedtime  insulin lispro Injectable (ADMELOG) 6 Unit(s) SubCutaneous three times a day before meals  multivitamin 1 Tablet(s) Oral daily  pantoprazole    Tablet 40 milliGRAM(s) Oral before breakfast  polyethylene glycol 3350 17 Gram(s) Oral daily  sodium chloride 0.9% lock flush 3 milliLiter(s) IV Push every 8 hours    MEDICATIONS  (PRN):  acetaminophen   Tablet .. 650 milliGRAM(s) Oral every 6 hours PRN Mild Pain (1 - 3)  oxycodone    5 mG/acetaminophen 325 mG 1 Tablet(s) Oral every 4 hours PRN Moderate Pain (4 - 6)  oxycodone    5 mG/acetaminophen 325 mG 2 Tablet(s) Oral every 6 hours PRN Severe Pain (7 - 10)          51 years old male with CAD.  S/P Off Pump CABG x 2.   LIMA to LAD. RA to OM.  Uncontrolled DM.  Hemodynamically stable.  Good oxygenation.  Fair urine out put.  Overall doing well.      My plan includes:  OPTIMIZE Glycemic Control.  Statin and Betablocker.  Dual antiplatelet Rx.  Close hemodynamic, ventilatory and drain monitoring and management  Monitor for arrhythmias and monitor parameters for organ perfusion  Monitor neurologic status  Monitor renal function.  Head of the bed should remain elevated to 45 deg .   Chest PT and IS will be encouraged  Monitor adequacy of oxygenation and ventilation and attempt to wean oxygen  Nutritional goals will be met using po eventually , ensure adequate caloric intake and monitor the same  Stress ulcer and VTE prophylaxis will be achieved    Electrolytes have been repleted as necessary and wound care has been carried out. Pain control has been achieved.   Aggressive physical therapy and early mobility and ambulation goals will be met   The family was updated about the course and plan  CRITICAL CARE TIME SPENT in evaluation and management, reassessments, review and interpretation of labs and x-rays, ventilator and hemodynamic management, formulating a plan and coordinating care: ___30____ MIN.  Time does not include procedural time.  CTICU ATTENDING     					    Jason Neal MD

## 2021-05-04 NOTE — PHYSICAL THERAPY INITIAL EVALUATION ADULT - GENERAL OBSERVATIONS, REHAB EVAL
Pt received OOB in chair +R radial artery +chest tube to wall suction +yu +EKG +hep lock +SCD. PT received consent to treat from BENJA Vasquez .Pt currently c/o 8/10 pain at chest tube site. Pt was left as received with call bell in reach, VSS, and in NAD. FIM gait 3.

## 2021-05-04 NOTE — PROGRESS NOTE ADULT - SUBJECTIVE AND OBJECTIVE BOX
INTERVAL HPI/OVERNIGHT EVENTS:    POD#1 OPCAB x 2  EF normal   Cardiologist, Dr. Timmy Reynolds    52yo Male Hx HTN, HLD, DM, CAD - prior stent w/anginal sxs -   Cath: 3 vl CAD, s/p PCI/TAINA->mid lad. On 2/3/15 s/p stage PCI/TAINA-> mid LCX and mid RCA, patent mid LAD stent.     presented with CP/SDOE  ECHO 9/29: normal EF, no valvular disease.   NST 4/9: (+)  Cath 4/19: severe 3 vessel with ISR. EF 45%.     to OR 5/3 - OPCAB x 2 no intraop blood/blod products given  extubated in short post-op period    transient levophed requirement overnight - off this am after volume given    no acute events reported overnight  up and ambulating with staff - reporting some incisional pain  well addressed per patient with pain meds      PMHx includes but is not limited to:   Type 2 diabetes mellitus  Hyperlipidemia  Essential hypertension  CAD/stent in LAD coronary artery/RCA    ICU Vital Signs Last 24 Hrs  T(C): 36.7 (04 May 2021 14:28), Max: 36.7 (03 May 2021 22:42)  T(F): 98 (04 May 2021 14:28), Max: 98 (03 May 2021 22:42)  HR: 94 (04 May 2021 14:00) (87 - 119) sinus   BP: 101/57 (03 May 2021 17:00) (101/57 - 101/57)  BP(mean): 73 (03 May 2021 17:00) (73 - 73)  ABP: 112/45 (04 May 2021 14:00) (92/45 - 136/53)  ABP(mean): 63 (04 May 2021 14:00) (58 - 78)  RR: 16 (04 May 2021 13:00) (16 - 35)  SpO2: 97% (04 May 2021 14:00) (95% - 100%) RA    Qtts: None     I&O's Summary    03 May 2021 07:01  -  04 May 2021 07:00  --------------------------------------------------------  IN: 4057 mL / OUT: 3340 mL / NET: 717 mL    04 May 2021 07:01  -  04 May 2021 16:24  --------------------------------------------------------  IN: 815.6 mL / OUT: 1520 mL / NET: -704.4 mL    Physical Exam    Heart - regular (-)rub/gallop  Lungs - CTA anterior/posterior - no rhonchi/wheeze  Abd - (+)BS soft NTND (-)r/r/g  Ext -warm to touch. no cyanosis/clubbing  Chest - op bandage in place  Neuro - alert/oriented and interactive ; non-focal  Skin - no rash     LABS:                        9.9    11.36 )-----------( 155      ( 04 May 2021 09:04 )             29.2     05-04    138  |  103  |  10  ----------------------------<  231<H>  4.3   |  26  |  0.80    Ca    8.5      04 May 2021 10:43  Phos  3.5     05-04  Mg     1.7     05-04    TPro  5.2<L>  /  Alb  3.8  /  TBili  0.7  /  DBili  x   /  AST  15  /  ALT  10  /  AlkPhos  35<L>  05-04    PT/INR - ( 04 May 2021 09:04 )   PT: 16.5 sec;   INR: 1.40     PTT - ( 04 May 2021 09:04 )  PTT:33.9 sec    ABG - ( 04 May 2021 08:57 ) 7.41/42/81/97    RADIOLOGY & ADDITIONAL STUDIES: reviewed     Patient prior stenting and known CAD - anginal sxs - now POD#1 OPCAB - doing well    1. CV  hemodyncamically stable  sinus rhythm   ASA/Plavix/statin   complete periop Abx prophylaxis   start metoprolol low dose and titrate as clinical scenario allows     2. Pulm   Sa02 good on RA  incentive spirometry and ambulation with staff assist  monitor for chest tube removal    3. Endocrine  glycemic control  Hx DM   /133/213  lantus/premeal and ISS - adjust as needed to maintain control   HgA1c 8.7  diabetic teaching and education     bowel regimen  pain management  anticipate transfer to floor when bed available     DVT and GI prophylaxis    d/w patient/staff and CTS    I have spent/provided stated minutes of critical care time to this patient: 90

## 2021-05-04 NOTE — CONSULT NOTE ADULT - SUBJECTIVE AND OBJECTIVE BOX
HPI: 50 yo male, Jakob/English speaking, presents with PMH of HTN, HLD, DM and family history of premature CAD(brother) with 3 vessel CAD. Patient initially presented in 2014 with class III angina, s/p cardiac cath by that revealed 3 vessel CAD, s/p PCI/TAINA->mid lad. On 2/3/15 s/p stage PCI/TAINA-> mid LCX and mid RCA, patent mid LAD stent. He recently presented to his cardiologist, Dr.Gagandeep Reynolds with new onset chest pain at rest and on exertion with accompanying HUBER over the past 2 weeks. CCS class III. 9/29/20 Echo revealed normal EF, no valvular disease. 4/9/21 + nuclear stress test. 4/19/21 s/p Cardiac cath that revealed severe 3 vessel with ISR. EF 45%.   Patient seen by Dr. Mehta in outpatient office and presented for elective surgery. S/p OPCAB on 5/4.     Home Medications:   · 	atorvastatin 40 mg oral tablet: Last Dose Taken: 02-May-2021 PM, 1 tab(s) orally once a day    · 	metFORMIN 1000 mg oral tablet: Last Dose Taken: 02-May-2021 PM, 1 tab(s) orally 2 times a day    · 	Trulicity Pen 0.75 mg/0.5 mL subcutaneous solution: Last Dose Taken: 29-Apr-2021 PM        Patient History:    Past Medical, Past Surgical, and Family History:  PAST MEDICAL HISTORY:  Essential hypertension Hypertension    Hyperlipidemia Hyperlipidemia    Presence of stent in LAD coronary artery     Presence of stent in right coronary artery     Type 2 diabetes mellitus Diabetes.     FAMILY HISTORY:  Family history of cardiovascular disease, Family history of heart disease.     Social History:  Social History (marital status, living situation, occupation, tobacco use, alcohol and drug use, and sexual history): . Works as . Never a smoker.      Current Meds:  acetaminophen   Tablet .. 650 milliGRAM(s) Oral every 6 hours PRN  aspirin enteric coated 81 milliGRAM(s) Oral daily  atorvastatin 80 milliGRAM(s) Oral at bedtime  ceFAZolin  Injectable. 2000 milliGRAM(s) IV Push every 8 hours  chlorhexidine 2% Cloths 1 Application(s) Topical daily  clopidogrel Tablet 75 milliGRAM(s) Oral daily  dextrose 40% Gel 15 Gram(s) Oral once  dextrose 5%. 1000 milliLiter(s) IV Continuous <Continuous>  dextrose 5%. 1000 milliLiter(s) IV Continuous <Continuous>  dextrose 50% Injectable 25 Gram(s) IV Push once  dextrose 50% Injectable 12.5 Gram(s) IV Push once  dextrose 50% Injectable 25 Gram(s) IV Push once  dextrose 50% Injectable 50 milliLiter(s) IV Push every 15 minutes  dextrose 50% Injectable 25 milliLiter(s) IV Push every 15 minutes  glucagon  Injectable 1 milliGRAM(s) IntraMuscular once  heparin   Injectable 5000 Unit(s) SubCutaneous every 8 hours  insulin glargine Injectable (LANTUS) 10 Unit(s) SubCutaneous at bedtime  insulin lispro (ADMELOG) corrective regimen sliding scale   SubCutaneous Before meals and at bedtime  insulin lispro Injectable (ADMELOG) 3 Unit(s) SubCutaneous three times a day before meals  insulin regular Infusion 1 Unit(s)/Hr IV Continuous <Continuous>  lactated ringers Bolus 500 milliLiter(s) IV Bolus once  multivitamin 1 Tablet(s) Oral daily  norepinephrine Infusion 0.05 MICROgram(s)/kG/Min IV Continuous <Continuous>  oxycodone    5 mG/acetaminophen 325 mG 1 Tablet(s) Oral every 4 hours PRN  oxycodone    5 mG/acetaminophen 325 mG 2 Tablet(s) Oral every 6 hours PRN  pantoprazole    Tablet 40 milliGRAM(s) Oral before breakfast  polyethylene glycol 3350 17 Gram(s) Oral daily  sodium chloride 0.9%. 1000 milliLiter(s) IV Continuous <Continuous>      Allergies:  No Known Allergies      ROS:  Denies the following except as indicated.    General: weight loss/weight gain, decreased appetite, fatigue  Eyes: Blurry vision, double vision, visual changes  ENT: Throat pain, changes in voice,   CV: palpitations, SOB, CP, cough  GI: NVD, difficulty swallowing, abdominal pain  : polyuria, dysuria  Endo: abnormal menses, temperature intolerance, decreased libido  MSK: weakness, joint pain  Skin: rash, dryness, diaphoresis  Heme: Easy bruising,bleeding  Neuro: HA, dizziness, lightheadedness, numbness tingling  Psych: Anxiety, Depression    Vital Signs Last 24 Hrs  T(C): 36.2 (04 May 2021 10:13), Max: 36.7 (03 May 2021 22:42)  T(F): 97.2 (04 May 2021 10:13), Max: 98 (03 May 2021 22:42)  HR: 109 (04 May 2021 12:00) (76 - 119)  BP: 101/57 (03 May 2021 17:00) (101/57 - 101/57)  BP(mean): 73 (03 May 2021 17:00) (73 - 73)  RR: 18 (04 May 2021 12:00) (12 - 22)  SpO2: 95% (04 May 2021 12:00) (95% - 100%)  Height (cm): 175.3 (04-30 @ 08:21)  Weight (kg): 75.659 (04-30 @ 08:21)  BMI (kg/m2): 24.6 (04-30 @ 08:21)      Constitutional: wn/wd in NAD.   HEENT: NCAT, MMM, OP clear, EOMI, , no proptosis or lid retraction  Neck: no thyromegaly or palpable thyroid nodules   Respiratory: lungs CTAB.  Cardiovascular: regular rhythm, normal S1 and S2, no audible murmurs, no peripheral edema  GI: soft, NT/ND, no masses/HSM appreciated.  Neurology: no tremors, DTR 2+  Skin: no visible rashes/lesions  Psychiatric: AAO x 3, normal affect/mood.  Ext: radial pulses intact, DP pulses intact, extremities warm, no cyanosis, clubbing or edema.       LABS:                        9.9    11.36 )-----------( 155      ( 04 May 2021 09:04 )             29.2     05-04    138  |  103  |  10  ----------------------------<  231<H>  4.3   |  26  |  0.80    Ca    8.5      04 May 2021 10:43  Phos  3.5     05-04  Mg     1.7     05-04    TPro  5.2<L>  /  Alb  3.8  /  TBili  0.7  /  DBili  x   /  AST  15  /  ALT  10  /  AlkPhos  35<L>  05-04    PT/INR - ( 04 May 2021 09:04 )   PT: 16.5 sec;   INR: 1.40          PTT - ( 04 May 2021 09:04 )  PTT:33.9 sec      Thyroid Stimulating Hormone, Serum: 1.915 (04-20 @ 15:37)      RADIOLOGY & ADDITIONAL STUDIES:  CAPILLARY BLOOD GLUCOSE      POCT Blood Glucose.: 133 mg/dL (04 May 2021 07:55)  POCT Blood Glucose.: 135 mg/dL (04 May 2021 07:21)  POCT Blood Glucose.: 128 mg/dL (04 May 2021 06:59)  POCT Blood Glucose.: 135 mg/dL (04 May 2021 06:21)  POCT Blood Glucose.: 117 mg/dL (04 May 2021 04:53)  POCT Blood Glucose.: 79 mg/dL (04 May 2021 03:54)  POCT Blood Glucose.: 100 mg/dL (04 May 2021 03:05)  POCT Blood Glucose.: 125 mg/dL (04 May 2021 02:03)  POCT Blood Glucose.: 150 mg/dL (04 May 2021 01:11)  POCT Blood Glucose.: 188 mg/dL (03 May 2021 23:58)  POCT Blood Glucose.: 209 mg/dL (03 May 2021 23:07)  POCT Blood Glucose.: 184 mg/dL (03 May 2021 22:12)  POCT Blood Glucose.: 175 mg/dL (03 May 2021 21:13)  POCT Blood Glucose.: 98 mg/dL (03 May 2021 19:10)  POCT Blood Glucose.: 109 mg/dL (03 May 2021 18:01)  POCT Blood Glucose.: 129 mg/dL (03 May 2021 17:11)  POCT Blood Glucose.: 169 mg/dL (03 May 2021 16:00)  POCT Blood Glucose.: 177 mg/dL (03 May 2021 14:56)  POCT Blood Glucose.: 184 mg/dL (03 May 2021 13:58)        A/P:51y Male    1.  DM  Please continue lantus       units at night / morning.  Please continue lispro      units before each meal.  Please continue lispro moderate / low dose sliding scale four times daily with meals and at bedtime    Pt's fingerstick glucose goal is     Will continue to monitor     For discharge, pt can continue    Pt can follow up at discharge with [facility] by calling  to make an appointment.   Will discuss case with     and update primary team HPI: 52 yo male, Jakob/English speaking, presents with PMH of HTN, HLD, DM and family history of premature CAD(brother) with 3 vessel CAD. Patient initially presented in  with class III angina, s/p cardiac cath by that revealed 3 vessel CAD, s/p PCI/TAINA->mid lad. On 2/3/15 s/p stage PCI/TAINA-> mid LCX and mid RCA, patent mid LAD stent. He recently presented to his cardiologist, Dr.Gagandeep Reynolds with new onset chest pain at rest and on exertion with accompanying HUBER over the past 2 weeks. CCS class III. 20 Echo revealed normal EF, no valvular disease. 21 + nuclear stress test. 21 s/p Cardiac cath that revealed severe 3 vessel with ISR. EF 45%.   Patient seen by Dr. Mehta in outpatient office and presented for elective surgery. S/p OPCAB on .     Endo consulted for DM management.   Patient was diagnosed with DM more than 10 years. He is currently trulicity 0.75 weekly  and metformin 1000 mg BID. He reports family hx DM in his mother. He doesn’t check fsg at home frequently once a week. fsg fasting : 150s. Diet: high crab (2 roti with vegetables He denies neuropathy or retinopathy. He denies drinking juice or soda. He follows with PCP in [facility].   Post op started on insulin gtt at rate 3 U/hr yesterday at 1 pm and held at 8am this morning. he received 5 U NPH at 6 am.     FSG & Insulin received:    Today:  pre-breakfast fs  nutritional lispro  3 units  pre-lunch fs  nutritional lispro  3 units+ 4  units lispro SS    Patient History:    Past Medical, Past Surgical, and Family History:  PAST MEDICAL HISTORY:  Essential hypertension Hypertension    Hyperlipidemia Hyperlipidemia    Presence of stent in LAD coronary artery     Presence of stent in right coronary artery     Type 2 diabetes mellitus Diabetes.     FAMILY HISTORY:  Family history of cardiovascular disease, Family history of heart disease.     Social History:  Social History (marital status, living situation, occupation, tobacco use, alcohol and drug use, and sexual history): . Works as . Never a smoker.      Current Meds:  acetaminophen   Tablet .. 650 milliGRAM(s) Oral every 6 hours PRN  aspirin enteric coated 81 milliGRAM(s) Oral daily  atorvastatin 80 milliGRAM(s) Oral at bedtime  ceFAZolin  Injectable. 2000 milliGRAM(s) IV Push every 8 hours  chlorhexidine 2% Cloths 1 Application(s) Topical daily  clopidogrel Tablet 75 milliGRAM(s) Oral daily  dextrose 40% Gel 15 Gram(s) Oral once  dextrose 5%. 1000 milliLiter(s) IV Continuous <Continuous>  dextrose 5%. 1000 milliLiter(s) IV Continuous <Continuous>  dextrose 50% Injectable 25 Gram(s) IV Push once  dextrose 50% Injectable 12.5 Gram(s) IV Push once  dextrose 50% Injectable 25 Gram(s) IV Push once  dextrose 50% Injectable 50 milliLiter(s) IV Push every 15 minutes  dextrose 50% Injectable 25 milliLiter(s) IV Push every 15 minutes  glucagon  Injectable 1 milliGRAM(s) IntraMuscular once  heparin   Injectable 5000 Unit(s) SubCutaneous every 8 hours  insulin glargine Injectable (LANTUS) 10 Unit(s) SubCutaneous at bedtime  insulin lispro (ADMELOG) corrective regimen sliding scale   SubCutaneous Before meals and at bedtime  insulin lispro Injectable (ADMELOG) 3 Unit(s) SubCutaneous three times a day before meals  insulin regular Infusion 1 Unit(s)/Hr IV Continuous <Continuous>  lactated ringers Bolus 500 milliLiter(s) IV Bolus once  multivitamin 1 Tablet(s) Oral daily  norepinephrine Infusion 0.05 MICROgram(s)/kG/Min IV Continuous <Continuous>  oxycodone    5 mG/acetaminophen 325 mG 1 Tablet(s) Oral every 4 hours PRN  oxycodone    5 mG/acetaminophen 325 mG 2 Tablet(s) Oral every 6 hours PRN  pantoprazole    Tablet 40 milliGRAM(s) Oral before breakfast  polyethylene glycol 3350 17 Gram(s) Oral daily  sodium chloride 0.9%. 1000 milliLiter(s) IV Continuous <Continuous>      Allergies:  No Known Allergies      ROS:  Denies the following except as indicated.    General: weight loss/weight gain, decreased appetite, fatigue  Eyes: Blurry vision, double vision, visual changes  ENT: Throat pain, changes in voice,   GI: NVD, difficulty swallowing, abdominal pain  : polyuria, dysuria  MSK: weakness, joint pain  Skin: rash, dryness, diaphoresis  Heme: Easy bruising,bleeding  Neuro: HA, dizziness    Vital Signs Last 24 Hrs  T(C): 36.2 (04 May 2021 10:13), Max: 36.7 (03 May 2021 22:42)  T(F): 97.2 (04 May 2021 10:13), Max: 98 (03 May 2021 22:42)  HR: 109 (04 May 2021 12:00) (76 - 119)  BP: 101/57 (03 May 2021 17:00) (101/57 - 101/57)  BP(mean): 73 (03 May 2021 17:00) (73 - 73)  RR: 18 (04 May 2021 12:00) (12 - 22)  SpO2: 95% (04 May 2021 12:00) (95% - 100%)  Height (cm): 175.3 ( @ 08:21)  Weight (kg): 75.659 ( @ 08:21)  BMI (kg/m2): 24.6 ( @ 08:21)      Constitutional:  in NAD.   HEENT:  no proptosis or lid retraction  Neck: no thyromegaly or palpable thyroid nodules   Respiratory: lungs CTAB.  Cardiovascular: regular rhythm, normal S1 and S2  GI: soft, NT/ND, no masses/HSM appreciated.  Neurology: no tremors, DTR 2+  Skin: no visible rashes/lesions  Psychiatric: AAO x 3, normal affect/mood.  Ext: radial pulses intact, DP pulses intact, extremities warm, no cyanosis, clubbing or edema.       LABS:                        9.9    11.36 )-----------( 155      ( 04 May 2021 09:04 )             29.2     05-    138  |  103  |  10  ----------------------------<  231<H>  4.3   |  26  |  0.80    Ca    8.5      04 May 2021 10:43  Phos  3.5     05-04  Mg     1.7     05-04    TPro  5.2<L>  /  Alb  3.8  /  TBili  0.7  /  DBili  x   /  AST  15  /  ALT  10  /  AlkPhos  35<L>  05-04    PT/INR - ( 04 May 2021 09:04 )   PT: 16.5 sec;   INR: 1.40          PTT - ( 04 May 2021 09:04 )  PTT:33.9 sec      Thyroid Stimulating Hormone, Serum: 1.915 ( @ 15:37)      RADIOLOGY & ADDITIONAL STUDIES:  CAPILLARY BLOOD GLUCOSE      POCT Blood Glucose.: 133 mg/dL (04 May 2021 07:55)  POCT Blood Glucose.: 135 mg/dL (04 May 2021 07:21)  POCT Blood Glucose.: 128 mg/dL (04 May 2021 06:59)  POCT Blood Glucose.: 135 mg/dL (04 May 2021 06:21)  POCT Blood Glucose.: 117 mg/dL (04 May 2021 04:53)  POCT Blood Glucose.: 79 mg/dL (04 May 2021 03:54)  POCT Blood Glucose.: 100 mg/dL (04 May 2021 03:05)  POCT Blood Glucose.: 125 mg/dL (04 May 2021 02:03)  POCT Blood Glucose.: 150 mg/dL (04 May 2021 01:11)  POCT Blood Glucose.: 188 mg/dL (03 May 2021 23:58)  POCT Blood Glucose.: 209 mg/dL (03 May 2021 23:07)  POCT Blood Glucose.: 184 mg/dL (03 May 2021 22:12)  POCT Blood Glucose.: 175 mg/dL (03 May 2021 21:13)  POCT Blood Glucose.: 98 mg/dL (03 May 2021 19:10)  POCT Blood Glucose.: 109 mg/dL (03 May 2021 18:01)  POCT Blood Glucose.: 129 mg/dL (03 May 2021 17:11)  POCT Blood Glucose.: 169 mg/dL (03 May 2021 16:00)  POCT Blood Glucose.: 177 mg/dL (03 May 2021 14:56)  POCT Blood Glucose.: 184 mg/dL (03 May 2021 13:58)        A/P: 52 yo male, Jakob/English speaking, presents with PMH of HTN, HLD, DM and family history of premature CAD(brother) with 3 vessel CAD. Patient initially presented in  with class III angina, s/p cardiac cath by that revealed 3 vessel CAD, s/p PCI/TAINA->mid lad. On 2/3/15 s/p stage PCI/TAINA-> mid LCX and mid RCA, patent mid LAD stent. He presented to his cardiologistwith new onset chest pain at rest and on exertion with accompanying HUBER over the past 2 weeks. S/p OPCAB on .   Endo consulted for DM management.     1. Uncontrolled type 2  DM  A1c: 8.7%  wt: 75, BMI:24.6  cr/GFR: wnl  Please give lantus    18   units at night .  Please give lispro  6    units before each meal.    Please continue lispro moderate dose sliding scale four times daily with meals and at bedtime    2. HLD   LDL: 38 at goal   on lipitor     Pt's fingerstick glucose goal is 100-180    Will continue to monitor     For discharge, pt can continue    Pt can follow up at discharge with [facility] by calling  to make an appointment.    case seen and discussed with Dr. Keenan  and update primary team

## 2021-05-04 NOTE — PROGRESS NOTE ADULT - ASSESSMENT
51 year old male with a history of HTN, HLD, DM and CAD s/p PCI presents with class III w/ severe 3 vessel CAD. Dr. Mehta reviewed the cardiac cath imaging and reports with the patient and his brother and discussed the case with Dr. Timmy Reynolds. Dr. Mehta discussed the risks, benefits and alternatives to surgery. Risks include but not limited to death, heart attack, bleeding, stroke, kidney problems and infection. He quoted a 1% operative mortality and complication risk. He also discussed the various approaches, minimally invasive versus sternotomy. Dr. Mehta feels the patient will benefit and is a candidate for off pump CABG. All questions were addressed.     Plan:   PST   covid test 4/30, results in [facility]  SDA, 5/3  patient instructed to take metoprolol on morning of surgery  instructions provided re antibacterial showers and pt given 3 sponges  pt instructed on use of the incentive spirometer    Plan to proceed with CABG.   51 year old male with a history of HTN, HLD, DM and CAD s/p PCI presents with class III w/ severe 3 vessel CAD. Dr. Mehta reviewed the cardiac cath imaging and reports with the patient and his brother and discussed the case with Dr. Timmy Reynolds. Dr. Mehta discussed the risks, benefits and alternatives to surgery. Risks include but not limited to death, heart attack, bleeding, stroke, kidney problems and infection. He quoted a 1% operative mortality and complication risk. He also discussed the various approaches, minimally invasive versus sternotomy. Dr. Mehta feels the patient will benefit and is a candidate for off pump CABG. All questions were addressed.     Neuro:  -A&O, non focal  -PRN Percocet/ Tyelenol    Cardiac:  -s/p obcab.   -SR, start BB as tolerates.   -Con't ASA, Plavix, Statin.     Endo:  -Hg A1c 8.0  -Endo following. Lantus 10 u Qhs, Lispro 3 u TID, Lispro SS    GI:  -Tolerating Po's  -Bowel meds  -Protonix for DVT ppx.     Renal  -BUN/Cr 10/0.8  -Vital out today, TOV    Heme:  -Hg/Hct 10/29  -Hep SQ for dvt ppx    Dispo:  -Plan for home when medically ready

## 2021-05-04 NOTE — PHYSICAL THERAPY INITIAL EVALUATION ADULT - PERTINENT HX OF CURRENT PROBLEM, REHAB EVAL
52 yo male, Jakob/English speaking, presents with PMH of HTN, HLD, DM and family history of premature CAD(brother) with 3 vessel CAD. Patient initially presented in 2014 with class III angina, s/p cardiac cath by that revealed 3 vessel CAD, s/p PCI/TAINA->mid lad. On 2/3/15 s/p stage PCI/TAINA-> mid LCX and mid RCA, patent mid LAD stent. He recently presented to his cardiologist, Dr.Gagandeep Reynolds with new onset chest pain at rest and on exertion with accompanying HUBER over the past 2 week OPCAB 5/3

## 2021-05-04 NOTE — PHYSICAL THERAPY INITIAL EVALUATION ADULT - IMPAIRMENTS CONTRIBUTING TO GAIT DEVIATIONS, PT EVAL
Chief Complaint   Patient presents with     Consult     HOSPITAL F/U     [facility]  
impaired balance/impaired postural control/decreased strength

## 2021-05-04 NOTE — CONSULT NOTE ADULT - TIME BILLING
Pt seen at bedside  52 yo M hx of type 2 DM s/p mid CAB  started on insulin infusion now transitioned off  poor diet at home  can continue 18 units lantus at bedtime, lispro 6 units tid with meals.   continue statin  will follow  unlikely need for insulin at discharge

## 2021-05-05 LAB
ALBUMIN SERPL ELPH-MCNC: 3.4 G/DL — SIGNIFICANT CHANGE UP (ref 3.3–5)
ALP SERPL-CCNC: 74 U/L — SIGNIFICANT CHANGE UP (ref 40–120)
ALT FLD-CCNC: 28 U/L — SIGNIFICANT CHANGE UP (ref 10–45)
ANION GAP SERPL CALC-SCNC: 8 MMOL/L — SIGNIFICANT CHANGE UP (ref 5–17)
AST SERPL-CCNC: 33 U/L — SIGNIFICANT CHANGE UP (ref 10–40)
BILIRUB SERPL-MCNC: 0.6 MG/DL — SIGNIFICANT CHANGE UP (ref 0.2–1.2)
BUN SERPL-MCNC: 12 MG/DL — SIGNIFICANT CHANGE UP (ref 7–23)
CALCIUM SERPL-MCNC: 8.6 MG/DL — SIGNIFICANT CHANGE UP (ref 8.4–10.5)
CHLORIDE SERPL-SCNC: 101 MMOL/L — SIGNIFICANT CHANGE UP (ref 96–108)
CO2 SERPL-SCNC: 27 MMOL/L — SIGNIFICANT CHANGE UP (ref 22–31)
CREAT SERPL-MCNC: 0.93 MG/DL — SIGNIFICANT CHANGE UP (ref 0.5–1.3)
GLUCOSE BLDC GLUCOMTR-MCNC: 107 MG/DL — HIGH (ref 70–99)
GLUCOSE BLDC GLUCOMTR-MCNC: 146 MG/DL — HIGH (ref 70–99)
GLUCOSE BLDC GLUCOMTR-MCNC: 156 MG/DL — HIGH (ref 70–99)
GLUCOSE BLDC GLUCOMTR-MCNC: 187 MG/DL — HIGH (ref 70–99)
GLUCOSE BLDC GLUCOMTR-MCNC: 213 MG/DL — HIGH (ref 70–99)
GLUCOSE SERPL-MCNC: 236 MG/DL — HIGH (ref 70–99)
HCT VFR BLD CALC: 28.5 % — LOW (ref 39–50)
HGB BLD-MCNC: 9.4 G/DL — LOW (ref 13–17)
MCHC RBC-ENTMCNC: 29.2 PG — SIGNIFICANT CHANGE UP (ref 27–34)
MCHC RBC-ENTMCNC: 33 GM/DL — SIGNIFICANT CHANGE UP (ref 32–36)
MCV RBC AUTO: 88.5 FL — SIGNIFICANT CHANGE UP (ref 80–100)
NRBC # BLD: 0 /100 WBCS — SIGNIFICANT CHANGE UP (ref 0–0)
PLATELET # BLD AUTO: 127 K/UL — LOW (ref 150–400)
POTASSIUM SERPL-MCNC: 4.5 MMOL/L — SIGNIFICANT CHANGE UP (ref 3.5–5.3)
POTASSIUM SERPL-SCNC: 4.5 MMOL/L — SIGNIFICANT CHANGE UP (ref 3.5–5.3)
PROT SERPL-MCNC: 6.1 G/DL — SIGNIFICANT CHANGE UP (ref 6–8.3)
RBC # BLD: 3.22 M/UL — LOW (ref 4.2–5.8)
RBC # FLD: 13.1 % — SIGNIFICANT CHANGE UP (ref 10.3–14.5)
SODIUM SERPL-SCNC: 136 MMOL/L — SIGNIFICANT CHANGE UP (ref 135–145)
WBC # BLD: 8.15 K/UL — SIGNIFICANT CHANGE UP (ref 3.8–10.5)
WBC # FLD AUTO: 8.15 K/UL — SIGNIFICANT CHANGE UP (ref 3.8–10.5)

## 2021-05-05 PROCEDURE — 99253 IP/OBS CNSLTJ NEW/EST LOW 45: CPT

## 2021-05-05 PROCEDURE — 71045 X-RAY EXAM CHEST 1 VIEW: CPT | Mod: 26

## 2021-05-05 PROCEDURE — 71045 X-RAY EXAM CHEST 1 VIEW: CPT | Mod: 26,77

## 2021-05-05 RX ORDER — INSULIN LISPRO 100/ML
8 VIAL (ML) SUBCUTANEOUS
Refills: 0 | Status: DISCONTINUED | OUTPATIENT
Start: 2021-05-05 | End: 2021-05-06

## 2021-05-05 RX ORDER — METOPROLOL TARTRATE 50 MG
12.5 TABLET ORAL
Refills: 0 | Status: DISCONTINUED | OUTPATIENT
Start: 2021-05-05 | End: 2021-05-06

## 2021-05-05 RX ORDER — METOPROLOL TARTRATE 50 MG
12.5 TABLET ORAL EVERY 12 HOURS
Refills: 0 | Status: DISCONTINUED | OUTPATIENT
Start: 2021-05-05 | End: 2021-05-05

## 2021-05-05 RX ADMIN — INSULIN GLARGINE 18 UNIT(S): 100 INJECTION, SOLUTION SUBCUTANEOUS at 21:54

## 2021-05-05 RX ADMIN — SODIUM CHLORIDE 3 MILLILITER(S): 9 INJECTION INTRAMUSCULAR; INTRAVENOUS; SUBCUTANEOUS at 21:30

## 2021-05-05 RX ADMIN — HEPARIN SODIUM 5000 UNIT(S): 5000 INJECTION INTRAVENOUS; SUBCUTANEOUS at 14:52

## 2021-05-05 RX ADMIN — ATORVASTATIN CALCIUM 80 MILLIGRAM(S): 80 TABLET, FILM COATED ORAL at 21:54

## 2021-05-05 RX ADMIN — Medication 6 UNIT(S): at 07:22

## 2021-05-05 RX ADMIN — Medication 12.5 MILLIGRAM(S): at 13:03

## 2021-05-05 RX ADMIN — HEPARIN SODIUM 5000 UNIT(S): 5000 INJECTION INTRAVENOUS; SUBCUTANEOUS at 21:54

## 2021-05-05 RX ADMIN — Medication 81 MILLIGRAM(S): at 12:55

## 2021-05-05 RX ADMIN — OXYCODONE AND ACETAMINOPHEN 1 TABLET(S): 5; 325 TABLET ORAL at 10:55

## 2021-05-05 RX ADMIN — Medication 650 MILLIGRAM(S): at 23:49

## 2021-05-05 RX ADMIN — Medication 650 MILLIGRAM(S): at 23:19

## 2021-05-05 RX ADMIN — Medication 2: at 13:03

## 2021-05-05 RX ADMIN — OXYCODONE AND ACETAMINOPHEN 1 TABLET(S): 5; 325 TABLET ORAL at 19:10

## 2021-05-05 RX ADMIN — PANTOPRAZOLE SODIUM 40 MILLIGRAM(S): 20 TABLET, DELAYED RELEASE ORAL at 06:15

## 2021-05-05 RX ADMIN — OXYCODONE AND ACETAMINOPHEN 1 TABLET(S): 5; 325 TABLET ORAL at 20:38

## 2021-05-05 RX ADMIN — Medication 650 MILLIGRAM(S): at 06:55

## 2021-05-05 RX ADMIN — Medication 650 MILLIGRAM(S): at 14:53

## 2021-05-05 RX ADMIN — Medication 1 TABLET(S): at 12:55

## 2021-05-05 RX ADMIN — Medication 650 MILLIGRAM(S): at 06:15

## 2021-05-05 RX ADMIN — CLOPIDOGREL BISULFATE 75 MILLIGRAM(S): 75 TABLET, FILM COATED ORAL at 12:55

## 2021-05-05 RX ADMIN — Medication 12.5 MILLIGRAM(S): at 19:10

## 2021-05-05 RX ADMIN — Medication 650 MILLIGRAM(S): at 15:24

## 2021-05-05 RX ADMIN — OXYCODONE AND ACETAMINOPHEN 1 TABLET(S): 5; 325 TABLET ORAL at 09:57

## 2021-05-05 RX ADMIN — Medication 6 UNIT(S): at 12:56

## 2021-05-05 RX ADMIN — Medication 8 UNIT(S): at 17:20

## 2021-05-05 RX ADMIN — POLYETHYLENE GLYCOL 3350 17 GRAM(S): 17 POWDER, FOR SOLUTION ORAL at 12:55

## 2021-05-05 RX ADMIN — SODIUM CHLORIDE 3 MILLILITER(S): 9 INJECTION INTRAMUSCULAR; INTRAVENOUS; SUBCUTANEOUS at 06:55

## 2021-05-05 RX ADMIN — HEPARIN SODIUM 5000 UNIT(S): 5000 INJECTION INTRAVENOUS; SUBCUTANEOUS at 06:15

## 2021-05-05 RX ADMIN — Medication 2: at 07:23

## 2021-05-05 RX ADMIN — SODIUM CHLORIDE 3 MILLILITER(S): 9 INJECTION INTRAMUSCULAR; INTRAVENOUS; SUBCUTANEOUS at 13:04

## 2021-05-05 NOTE — PROGRESS NOTE ADULT - SUBJECTIVE AND OBJECTIVE BOX
Patient discussed on morning rounds with Dr. Mehta     Operation / Date: 5/3/21 OPCABx2 (LIMA to LAD, RA to OM), EF 50%    SUBJECTIVE ASSESSMENT:  51y Male. NAEO. Patient denies sob, palpitations, chest pain. Patient denies fevers, nausea, vomiting, diarrhea, constipation. Patient hungry and disgruntled with  here. Otherwise doing well.     Vital Signs Last 24 Hrs  T(C): 37.3 (05 May 2021 09:00), Max: 37.3 (05 May 2021 05:02)  T(F): 99.1 (05 May 2021 09:00), Max: 99.2 (05 May 2021 05:02)  HR: 114 (05 May 2021 09:30) (94 - 114)  BP: 110/56 (05 May 2021 09:30) (97/52 - 110/56)  BP(mean): 74 (05 May 2021 09:30) (68 - 78)  RR: 20 (05 May 2021 09:30) (16 - 20)  SpO2: 99% (05 May 2021 09:30) (94% - 99%)  I&O's Detail    04 May 2021 07:01  -  05 May 2021 07:00  --------------------------------------------------------  IN:    Lactated Ringers Bolus: 750 mL    Norepinephrine: 5.6 mL    sodium chloride 0.9%: 60 mL  Total IN: 815.6 mL    OUT:    Bulb (mL): 150 mL    Chest Tube (mL): 70 mL    Indwelling Catheter - Urethral (mL): 1300 mL    Voided (mL): 300 mL  Total OUT: 1820 mL    Total NET: -1004.4 mL    CHEST TUBE:  No.    MELANIE DRAIN: Yes.  EPICARDIAL WIRES: No.  TIE DOWNS: No.  AL: No.    PHYSICAL EXAM:    General:  NAD, comfortable    Neurological: A&O, non focal    Cardiovascular: RRR, no m/r/g    Respiratory: CTABL, no wheezing, rubs, gallops    Gastrointestinal: + BS, soft non tender    Extremities: warm, no edema    Incision Sites: c/d/i    LABS:                        9.9    11.36 )-----------( 155      ( 04 May 2021 09:04 )             29.2       COUMADIN:  No. REASON: .    PT/INR - ( 04 May 2021 09:04 )   PT: 16.5 sec;   INR: 1.40          PTT - ( 04 May 2021 09:04 )  PTT:33.9 sec    05-04    138  |  103  |  10  ----------------------------<  231<H>  4.3   |  26  |  0.80    Ca    8.5      04 May 2021 10:43  Phos  3.5     05-04  Mg     1.7     05-04    TPro  5.2<L>  /  Alb  3.8  /  TBili  0.7  /  DBili  x   /  AST  15  /  ALT  10  /  AlkPhos  35<L>  05-04      MEDICATIONS  (STANDING):  aspirin enteric coated 81 milliGRAM(s) Oral daily  atorvastatin 80 milliGRAM(s) Oral at bedtime  clopidogrel Tablet 75 milliGRAM(s) Oral daily  dextrose 50% Injectable 12.5 Gram(s) IV Push once  dextrose 50% Injectable 25 Gram(s) IV Push once  glucagon  Injectable 1 milliGRAM(s) IntraMuscular once  heparin   Injectable 5000 Unit(s) SubCutaneous every 8 hours  insulin glargine Injectable (LANTUS) 18 Unit(s) SubCutaneous at bedtime  insulin lispro (ADMELOG) corrective regimen sliding scale   SubCutaneous Before meals and at bedtime  insulin lispro Injectable (ADMELOG) 6 Unit(s) SubCutaneous three times a day before meals  metoprolol tartrate 12.5 milliGRAM(s) Oral every 12 hours  multivitamin 1 Tablet(s) Oral daily  pantoprazole    Tablet 40 milliGRAM(s) Oral before breakfast  polyethylene glycol 3350 17 Gram(s) Oral daily  sodium chloride 0.9% lock flush 3 milliLiter(s) IV Push every 8 hours    MEDICATIONS  (PRN):  acetaminophen   Tablet .. 650 milliGRAM(s) Oral every 6 hours PRN Mild Pain (1 - 3)  oxycodone    5 mG/acetaminophen 325 mG 1 Tablet(s) Oral every 4 hours PRN Moderate Pain (4 - 6)  oxycodone    5 mG/acetaminophen 325 mG 2 Tablet(s) Oral every 6 hours PRN Severe Pain (7 - 10)        RADIOLOGY & ADDITIONAL TESTS:    < from: Xray Chest 1 View- PORTABLE-Urgent (Xray Chest 1 View- PORTABLE-Urgent .) (05.03.21 @ 13:08) >    Findings: Endotracheal tube tip is just above the michelle. Distal tip of right IJ tip appears in the region of the superior vena cava. Status post median sternotomy.    The cardiac silhouette is not enlarged.    No pneumothorax is visualized. There are bilateral chest tubes.    No pneumomediastinum is visualized.    No pleural effusions visualized.    No focal airspace consolidation is visualized.    No fracture is visualized.    Impression:Postoperative changes as described above.    < end of copied text >   Patient discussed on morning rounds with Dr. Mehta     Operation / Date: 5/3/21 OPCABx2 (LIMA to LAD, RA to OM), EF 50%    SUBJECTIVE ASSESSMENT:  51y Male. NAEO. Patient denies sob, palpitations, chest pain. Patient denies fevers, nausea, vomiting, diarrhea, constipation. Patient hungry and disgruntled with  here. Otherwise doing well.     Vital Signs Last 24 Hrs  T(C): 37.3 (05 May 2021 09:00), Max: 37.3 (05 May 2021 05:02)  T(F): 99.1 (05 May 2021 09:00), Max: 99.2 (05 May 2021 05:02)  HR: 114 (05 May 2021 09:30) (94 - 114)  BP: 110/56 (05 May 2021 09:30) (97/52 - 110/56)  BP(mean): 74 (05 May 2021 09:30) (68 - 78)  RR: 20 (05 May 2021 09:30) (16 - 20)  SpO2: 99% (05 May 2021 09:30) (94% - 99%)  I&O's Detail    04 May 2021 07:01  -  05 May 2021 07:00  --------------------------------------------------------  IN:    Lactated Ringers Bolus: 750 mL    Norepinephrine: 5.6 mL    sodium chloride 0.9%: 60 mL  Total IN: 815.6 mL    OUT:    Bulb (mL): 150 mL    Chest Tube (mL): 70 mL    Indwelling Catheter - Urethral (mL): 1300 mL    Voided (mL): 300 mL  Total OUT: 1820 mL    Total NET: -1004.4 mL    CHEST TUBE:  No.    MELANIE DRAIN: No.  EPICARDIAL WIRES: No.  TIE DOWNS: No.  AL: No.    PHYSICAL EXAM:    General:  NAD, comfortable    Neurological: A&O, non focal    Cardiovascular: RRR, no m/r/g    Respiratory: CTABL, no wheezing, rubs, gallops    Gastrointestinal: + BS, soft non tender    Extremities: warm, no edema    Incision Sites: c/d/i    LABS:                        9.9    11.36 )-----------( 155      ( 04 May 2021 09:04 )             29.2       COUMADIN:  No. REASON: .    PT/INR - ( 04 May 2021 09:04 )   PT: 16.5 sec;   INR: 1.40          PTT - ( 04 May 2021 09:04 )  PTT:33.9 sec    05-04    138  |  103  |  10  ----------------------------<  231<H>  4.3   |  26  |  0.80    Ca    8.5      04 May 2021 10:43  Phos  3.5     05-04  Mg     1.7     05-04    TPro  5.2<L>  /  Alb  3.8  /  TBili  0.7  /  DBili  x   /  AST  15  /  ALT  10  /  AlkPhos  35<L>  05-04      MEDICATIONS  (STANDING):  aspirin enteric coated 81 milliGRAM(s) Oral daily  atorvastatin 80 milliGRAM(s) Oral at bedtime  clopidogrel Tablet 75 milliGRAM(s) Oral daily  dextrose 50% Injectable 12.5 Gram(s) IV Push once  dextrose 50% Injectable 25 Gram(s) IV Push once  glucagon  Injectable 1 milliGRAM(s) IntraMuscular once  heparin   Injectable 5000 Unit(s) SubCutaneous every 8 hours  insulin glargine Injectable (LANTUS) 18 Unit(s) SubCutaneous at bedtime  insulin lispro (ADMELOG) corrective regimen sliding scale   SubCutaneous Before meals and at bedtime  insulin lispro Injectable (ADMELOG) 6 Unit(s) SubCutaneous three times a day before meals  metoprolol tartrate 12.5 milliGRAM(s) Oral every 12 hours  multivitamin 1 Tablet(s) Oral daily  pantoprazole    Tablet 40 milliGRAM(s) Oral before breakfast  polyethylene glycol 3350 17 Gram(s) Oral daily  sodium chloride 0.9% lock flush 3 milliLiter(s) IV Push every 8 hours    MEDICATIONS  (PRN):  acetaminophen   Tablet .. 650 milliGRAM(s) Oral every 6 hours PRN Mild Pain (1 - 3)  oxycodone    5 mG/acetaminophen 325 mG 1 Tablet(s) Oral every 4 hours PRN Moderate Pain (4 - 6)  oxycodone    5 mG/acetaminophen 325 mG 2 Tablet(s) Oral every 6 hours PRN Severe Pain (7 - 10)        RADIOLOGY & ADDITIONAL TESTS:    < from: Xray Chest 1 View- PORTABLE-Urgent (Xray Chest 1 View- PORTABLE-Urgent .) (05.03.21 @ 13:08) >    Findings: Endotracheal tube tip is just above the michelle. Distal tip of right IJ tip appears in the region of the superior vena cava. Status post median sternotomy.    The cardiac silhouette is not enlarged.    No pneumothorax is visualized. There are bilateral chest tubes.    No pneumomediastinum is visualized.    No pleural effusions visualized.    No focal airspace consolidation is visualized.    No fracture is visualized.    Impression:Postoperative changes as described above.    < end of copied text >

## 2021-05-05 NOTE — DIETITIAN INITIAL EVALUATION ADULT. - ADD RECOMMEND
1. Encourage intake through day 2. Manage pain 3. Trend wts 4. Monitor and replete lytes 5. Reinforce ed

## 2021-05-05 NOTE — PROGRESS NOTE ADULT - ATTENDING COMMENTS
Pt seen at bedside  Agree with above  eating well  out of bed, ambulating  insulin administration reviewed  continue 18 units lantus at bedtime, lispro 8 units tid with meals  continue statin  will follow

## 2021-05-05 NOTE — PROGRESS NOTE ADULT - SUBJECTIVE AND OBJECTIVE BOX
INTERVAL HPI/OVERNIGHT EVENTS:    Patient is a 51y old  Male who presents with a chief complaint of CAD (05 May 2021 12:57)    FSG & Insulin received:    Yesterday:  pre-dinner fs  nutritional lispro 3  units + 2  units lispro SS  bedtime fs  lantus 18  units     Today:  pre-breakfast fs  nutritional lispro  6 units+  2  units lispro SS  pre-lunch fs  nutritional lispro 6  units+  4 units lispro SS    Pt reports the following symptoms:    CONSTITUTIONAL:  Negative fever or chills, feels well, good appetite  EYES:  Negative  blurry vision or double vision  CARDIOVASCULAR:  Negative for chest pain or palpitations  RESPIRATORY:  Negative for cough, wheezing, or SOB   GASTROINTESTINAL:  Negative for nausea, vomiting, diarrhea, constipation, or abdominal pain  GENITOURINARY:  Negative frequency, urgency or dysuria  NEUROLOGIC:  No headache, confusion, dizziness, lightheadedness    MEDICATIONS  (STANDING):  aspirin enteric coated 81 milliGRAM(s) Oral daily  atorvastatin 80 milliGRAM(s) Oral at bedtime  clopidogrel Tablet 75 milliGRAM(s) Oral daily  dextrose 50% Injectable 12.5 Gram(s) IV Push once  dextrose 50% Injectable 25 Gram(s) IV Push once  glucagon  Injectable 1 milliGRAM(s) IntraMuscular once  heparin   Injectable 5000 Unit(s) SubCutaneous every 8 hours  insulin glargine Injectable (LANTUS) 18 Unit(s) SubCutaneous at bedtime  insulin lispro (ADMELOG) corrective regimen sliding scale   SubCutaneous Before meals and at bedtime  insulin lispro Injectable (ADMELOG) 6 Unit(s) SubCutaneous three times a day before meals  metoprolol tartrate 12.5 milliGRAM(s) Oral every 12 hours  multivitamin 1 Tablet(s) Oral daily  pantoprazole    Tablet 40 milliGRAM(s) Oral before breakfast  polyethylene glycol 3350 17 Gram(s) Oral daily  sodium chloride 0.9% lock flush 3 milliLiter(s) IV Push every 8 hours    MEDICATIONS  (PRN):  acetaminophen   Tablet .. 650 milliGRAM(s) Oral every 6 hours PRN Mild Pain (1 - 3)  oxycodone    5 mG/acetaminophen 325 mG 1 Tablet(s) Oral every 4 hours PRN Moderate Pain (4 - 6)  oxycodone    5 mG/acetaminophen 325 mG 2 Tablet(s) Oral every 6 hours PRN Severe Pain (7 - 10)      Past medical history reviewed  Family history reviewed  Social history reviewed    PHYSICAL EXAM  Vital Signs Last 24 Hrs  T(C): 37.3 (05 May 2021 09:00), Max: 37.3 (05 May 2021 05:02)  T(F): 99.1 (05 May 2021 09:00), Max: 99.2 (05 May 2021 05:02)  HR: 114 (05 May 2021 09:30) (94 - 114)  BP: 110/56 (05 May 2021 09:30) (97/52 - 110/56)  BP(mean): 74 (05 May 2021 09:30) (68 - 78)  RR: 20 (05 May 2021 09:30) (18 - 20)  SpO2: 99% (05 May 2021 09:30) (94% - 99%)  Constitutional:  in NAD.   HEENT:  no proptosis or lid retraction  Neck: no thyromegaly or palpable thyroid nodules   Respiratory: lungs CTAB.  Cardiovascular: regular rhythm, normal S1 and S2  GI: soft, NT/ND, no masses/HSM appreciated.  Neurology: no tremors, DTR 2+  Skin: no visible rashes/lesions  Psychiatric: AAO x 3, normal affect/mood.  Ext: radial pulses intact, DP pulses intact, extremities warm, no cyanosis, clubbing or edema.     LABS:                        9.9    11.36 )-----------( 155      ( 04 May 2021 09:04 )             29.2     05-04    138  |  103  |  10  ----------------------------<  231<H>  4.3   |  26  |  0.80    Ca    8.5      04 May 2021 10:43  Phos  3.5     05-04  Mg     1.7     05-04    TPro  5.2<L>  /  Alb  3.8  /  TBili  0.7  /  DBili  x   /  AST  15  /  ALT  10  /  AlkPhos  35<L>  05-04    PT/INR - ( 04 May 2021 09:04 )   PT: 16.5 sec;   INR: 1.40          PTT - ( 04 May 2021 09:04 )  PTT:33.9 sec    Thyroid Stimulating Hormone, Serum: 1.915 uIU/mL ( @ 15:37)      HbA1C: 8.7 % ( @ 15:38)      CAPILLARY BLOOD GLUCOSE      POCT Blood Glucose.: 187 mg/dL (05 May 2021 13:01)  POCT Blood Glucose.: 213 mg/dL (05 May 2021 11:49)  POCT Blood Glucose.: 156 mg/dL (05 May 2021 07:12)  POCT Blood Glucose.: 147 mg/dL (04 May 2021 21:40)  POCT Blood Glucose.: 165 mg/dL (04 May 2021 17:07)      Cholesterol, Serum: 107 mg/dL (21 @ 15:37)  HDL Cholesterol, Serum: 38 mg/dL (21 @ 15:37)  Triglycerides, Serum: 149 mg/dL (21 @ 15:37)    A/P: 52 yo male, Jakob/English speaking, presents with PMH of HTN, HLD, DM and family history of premature CAD(brother) with 3 vessel CAD. Patient initially presented in  with class III angina, s/p cardiac cath by that revealed 3 vessel CAD, s/p PCI/TAINA->mid lad. On 2/3/15 s/p stage PCI/TAINA-> mid LCX and mid RCA, patent mid LAD stent. He presented to his cardiologistwith new onset chest pain at rest and on exertion with accompanying HUBER over the past 2 weeks. S/p OPCAB on .   Endo consulted for DM management.     1. Uncontrolled type 2  DM  A1c: 8.7%  wt: 75, BMI:24.6  cr/GFR: wnl  Please give lantus       units at night .  Please give lispro      units before each meal.    Please continue lispro moderate dose sliding scale four times daily with meals and at bedtime    2. HLD   LDL: 38 at goal   on lipitor     Pt's fingerstick glucose goal is 100-180    Will continue to monitor     For discharge, pt can continue    Pt can follow up at discharge with [facility] by calling  to make an appointment.    case seen and discussed with Dr. Keenan  and update primary team     INTERVAL HPI/OVERNIGHT EVENTS:    Patient is a 51y old  Male who presents with a chief complaint of CAD (05 May 2021 12:57)  Pt was seen and examined at the bedside. He reports good appetite.   He ate oatmeal with coffee fro breakfast.   denies any chest pain or SOB.   FSG & Insulin received:    Yesterday:  pre-dinner fs  nutritional lispro 3  units + 2  units lispro SS  bedtime fs  lantus 18  units     Today:  pre-breakfast fs  nutritional lispro  6 units+  2  units lispro SS  pre-lunch fs  nutritional lispro 6  units+  4 units lispro SS    Pt reports the following symptoms:    CONSTITUTIONAL:  Negative fever or chills, feels well, good appetite  EYES:  Negative  blurry vision or double vision  CARDIOVASCULAR:  Negative for chest pain or palpitations  RESPIRATORY:  Negative for cough, wheezing, or SOB   GASTROINTESTINAL:  Negative for nausea, vomiting, diarrhea, constipation, or abdominal pain  GENITOURINARY:  Negative frequency, urgency or dysuria  NEUROLOGIC:  No headache, confusion, dizziness, lightheadedness    MEDICATIONS  (STANDING):  aspirin enteric coated 81 milliGRAM(s) Oral daily  atorvastatin 80 milliGRAM(s) Oral at bedtime  clopidogrel Tablet 75 milliGRAM(s) Oral daily  dextrose 50% Injectable 12.5 Gram(s) IV Push once  dextrose 50% Injectable 25 Gram(s) IV Push once  glucagon  Injectable 1 milliGRAM(s) IntraMuscular once  heparin   Injectable 5000 Unit(s) SubCutaneous every 8 hours  insulin glargine Injectable (LANTUS) 18 Unit(s) SubCutaneous at bedtime  insulin lispro (ADMELOG) corrective regimen sliding scale   SubCutaneous Before meals and at bedtime  insulin lispro Injectable (ADMELOG) 6 Unit(s) SubCutaneous three times a day before meals  metoprolol tartrate 12.5 milliGRAM(s) Oral every 12 hours  multivitamin 1 Tablet(s) Oral daily  pantoprazole    Tablet 40 milliGRAM(s) Oral before breakfast  polyethylene glycol 3350 17 Gram(s) Oral daily  sodium chloride 0.9% lock flush 3 milliLiter(s) IV Push every 8 hours    MEDICATIONS  (PRN):  acetaminophen   Tablet .. 650 milliGRAM(s) Oral every 6 hours PRN Mild Pain (1 - 3)  oxycodone    5 mG/acetaminophen 325 mG 1 Tablet(s) Oral every 4 hours PRN Moderate Pain (4 - 6)  oxycodone    5 mG/acetaminophen 325 mG 2 Tablet(s) Oral every 6 hours PRN Severe Pain (7 - 10)      Past medical history reviewed  Family history reviewed  Social history reviewed    PHYSICAL EXAM  Vital Signs Last 24 Hrs  T(C): 37.3 (05 May 2021 09:00), Max: 37.3 (05 May 2021 05:02)  T(F): 99.1 (05 May 2021 09:00), Max: 99.2 (05 May 2021 05:02)  HR: 114 (05 May 2021 09:30) (94 - 114)  BP: 110/56 (05 May 2021 09:30) (97/52 - 110/56)  BP(mean): 74 (05 May 2021 09:30) (68 - 78)  RR: 20 (05 May 2021 09:30) (18 - 20)  SpO2: 99% (05 May 2021 09:30) (94% - 99%)  Constitutional:  in NAD.   HEENT:  no proptosis or lid retraction  Neck: no thyromegaly or palpable thyroid nodules   Respiratory: lungs CTAB.  Cardiovascular: regular rhythm, normal S1 and S2  GI: soft, NT/ND, no masses/HSM appreciated.  Neurology: no tremors, DTR 2+  Skin: no visible rashes/lesions  Psychiatric: AAO x 3, normal affect/mood.  Ext: radial pulses intact, DP pulses intact, extremities warm, no cyanosis, clubbing or edema.     LABS:                        9.9    11.36 )-----------( 155      ( 04 May 2021 09:04 )             29.2         138  |  103  |  10  ----------------------------<  231<H>  4.3   |  26  |  0.80    Ca    8.5      04 May 2021 10:43  Phos  3.5     05-  Mg     1.7         TPro  5.2<L>  /  Alb  3.8  /  TBili  0.7  /  DBili  x   /  AST  15  /  ALT  10  /  AlkPhos  35<L>  -04    PT/INR - ( 04 May 2021 09:04 )   PT: 16.5 sec;   INR: 1.40          PTT - ( 04 May 2021 09:04 )  PTT:33.9 sec    Thyroid Stimulating Hormone, Serum: 1.915 uIU/mL ( @ 15:37)      HbA1C: 8.7 % ( @ 15:38)      CAPILLARY BLOOD GLUCOSE      POCT Blood Glucose.: 187 mg/dL (05 May 2021 13:01)  POCT Blood Glucose.: 213 mg/dL (05 May 2021 11:49)  POCT Blood Glucose.: 156 mg/dL (05 May 2021 07:12)  POCT Blood Glucose.: 147 mg/dL (04 May 2021 21:40)  POCT Blood Glucose.: 165 mg/dL (04 May 2021 17:07)      Cholesterol, Serum: 107 mg/dL (21 @ 15:37)  HDL Cholesterol, Serum: 38 mg/dL (21 @ 15:37)  Triglycerides, Serum: 149 mg/dL (21 @ 15:37)    A/P: 50 yo male, Jakob/English speaking, presents with PMH of HTN, HLD, DM and family history of premature CAD(brother) with 3 vessel CAD. Patient initially presented in  with class III angina, s/p cardiac cath by that revealed 3 vessel CAD, s/p PCI/TAINA->mid lad. On 2/3/15 s/p stage PCI/TAINA-> mid LCX and mid RCA, patent mid LAD stent. He presented to his cardiologistwith new onset chest pain at rest and on exertion with accompanying HUBER over the past 2 weeks. S/p OPCAB on .   Endo consulted for DM management.     1. Uncontrolled type 2  DM  A1c: 8.7%  wt: 75, BMI:24.6  cr/GFR: wnl  Please give lantus   18    units at night .  Please give lispro   8   units before each meal.    Please continue lispro moderate dose sliding scale four times daily with meals and at bedtime    2. HLD   LDL: 38 at goal   on lipitor     Pt's fingerstick glucose goal is 100-180    Will continue to monitor     For discharge, pt can continue    Pt can follow up at discharge with [facility] by calling  to make an appointment.    case seen and discussed with Dr. Keenan  and update primary team

## 2021-05-05 NOTE — DIETITIAN INITIAL EVALUATION ADULT. - OTHER CALCULATIONS
ABW used for calculations as pt between % of IBW. (104% IBW). Nutrient needs based on [facility] standards of care for maintenance in adults, adjusted for post-op needs, age, fluid per team

## 2021-05-05 NOTE — CONSULT NOTE ADULT - SUBJECTIVE AND OBJECTIVE BOX
Preventive Cardiology Consultation Note    Cardiologist - Dr. Timmy Reynolds     CHIEF COMPLAINT: s/p CABG requiring cardiovascular prevention optimization and education    HISTORY OF PRESENT ILLNESS: 50 yo male, Jakob/English speaking, presents with PMH of HTN, HLD, DM and family history of premature CAD(brother) with 3 vessel CAD. Patient initially presented in 2014 with class III angina, s/p cardiac cath by that revealed 3 vessel CAD, s/p PCI/TAINA->mid lad. On 2/3/15 s/p staged PCI/TAINA-> mid LCX and mid RCA, patent mid LAD stent. He recently presented to his cardiologist, Dr.Gagandeep Reynolds with new onset chest pain at rest and on exertion with accompanying HUBER over the past 2 weeks. 4/9/21 + nuclear stress test. 4/19/21 s/p Cardiac cath that revealed severe 3 vessel with ISR. EF 45%. Patient is now s/p OPCABx2 (LIMA to LAD, RA to OM)  EF 50% on 5/3/21.     Review of systems otherwise negative.     Lifestyle History:  Mediterranean Diet Score (9 question survey) was 6.   (8-9: optimal, 6-7: near-optimal, 4-5: suboptimal, 0-3: markedly suboptimal)  Exercise: Patient reports exercising at a moderate level for 120-150 minutes per week.   Smoking: Patient denies any history of smoking.   Stress: Patient denies any stress.     PAST MEDICAL & SURGICAL HISTORY:  Type 2 diabetes mellitus  Diabetes    Hyperlipidemia  Hyperlipidemia    Essential hypertension  Hypertension    Presence of stent in LAD coronary artery    Presence of stent in right coronary artery      FAMILY HISTORY:   CAD - brother (details unclear); father had PCI but not until his 80s     Allergies:   No Known Allergies      HOME MEDICATIONS:   · 	atorvastatin 40 mg oral tablet: Last Dose Taken: 02-May-2021 PM, 1 tab(s) orally once a day  · 	Ecotrin Adult Low Strength 81 mg oral delayed release tablet: Last Dose Taken: 02-May-2021 AM, 1 tab(s) orally once a day  · 	metFORMIN 1000 mg oral tablet: Last Dose Taken: 02-May-2021 PM, 1 tab(s) orally 2 times a day  · 	metoprolol tartrate 25 mg oral tablet: Last Dose Taken: 03-May-2021 AM, 1 tab(s) orally 2 times a day  · 	Trulicity Pen 0.75 mg/0.5 mL subcutaneous solution: Last Dose Taken: 29-Apr-2021 PM    PHYSICAL EXAM:  T(C): 36.4 (05-06-21 @ 05:12), Max: 37.9 (05-05-21 @ 21:01)  T(F): 97.6 (05-06-21 @ 05:12), Max: 100.2 (05-05-21 @ 21:01)  HR: 90 (05-06-21 @ 06:26) (90 - 112)  BP: 106/58 (05-06-21 @ 06:26) (97/51 - 118/58)  RR: 18 (05-06-21 @ 06:26) (18 - 20)  SpO2: 97% (05-06-21 @ 06:26) (97% - 99%)    	  Gen- awake, conversive, sitting up in chair   Head-NCAT; eyes: no corneal arcus noted b/l; no xanthelasmas   Neck- no JVD, no carotid bruit b/l  Respiratory- respirations non-labored; clear to auscultation b/l   Cardiovascular- S1S2, RRR, no murmur  Neurology- alert and oriented x 3, no focal deficits  Psych- normal affect; appearance, verbalizations, behaviors are appropriate   Skin- surgical dressing C/D/I     LABS:	                        8.7    8.85  )-----------( 128      ( 06 May 2021 06:04 )             26.4     05-06    140  |  102  |  11  ----------------------------<  123<H>  3.8   |  29  |  0.96    Ca    8.9      06 May 2021 06:04  Phos  3.5     05-04  Mg     1.9     05-06    TPro  5.9<L>  /  Alb  3.4  /  TBili  0.7  /  DBili  x   /  AST  53<H>  /  ALT  55<H>  /  AlkPhos  87  05-06      Cholesterol, Serum: 107 mg/dL (4/20/21 15:37)  Triglycerides, Serum: 149 mg/dL (4/20/21 15:37)  HDL Cholesterol, Serum: 38 mg/dL (4/20/21 15:37)  LDL Cholesterol, Calculated: 39 mg/dL (4/20/21 15:37)  HbA1c: 8.7% (4/20/21 15:37)    ASSESSMENT/RECOMMENDATIONS: 	  Patient's dietary, exercise and overall lifestyle habits were reviewed. The concept of atherosclerosis and its systemic nature was discussed with a focus on the need to get all cardiovascular risk factors to goal. At this time, I would like to make the following recommendations to optimize atherosclerotic risk factors.     RECOMMENDATIONS:   Anti-platelet Therapy: APT per primary team recommendation.   Lipid Therapy: Patient is currently taking atorvastatin 40mg daily and is compliant and tolerating it well. We believe this is an appropriate medication at this time, as his current LDL-C is at goal and lifestyle modifications will likely benefit him further. We would also recommend checking the Lipoprotein A level (Lpa) to assess for a possible genetic component. If elevated, we advise the first degree relatives - siblings and children - also be checked, as it is a strong risk factor for ASCVD.   Hypertension: Blood pressures during this stay were well-controlled.   Mediterranean Diet Score is 6. Some suggestions include continue incorporating 2 or more servings per day of vegetables, fruits, and whole grains. Increase intake of fish and legumes/beans to 2 or more servings per week. Aim to increase intake of healthy fats, such as olive oil and avocados, and have a handful of nuts/seeds most days. Patient rarely eats red/processed meats and keeps his consumption well below the recommended 2 or fewer servings per week.   Exercise: Recommended gradually increasing activity to 30-45 minutes most days of the week once cleared by referring cardiologist. Cardiac rehab might benefit this patient and is covered by major insurance plans (other than co-pays), please refer.   Medication Adherence: Patient has no issues with adherence at this time.   Smoking: This patient is a non-smoker.   Obesity/Overweight: The patient's BMI is WNL at 24.6.  Glucometabolic State: Patient's blood sugar is not at goal at this time. HbA1c is 8.7%. We would recommend increasing his Trulicity dosage to 1.5mg as long as he has been on the 0.75mg dosage for at least 4 weeks. If not well controlled on that dosage after 4 weeks, can increase to 3.0mg, and then to the max dosage of 4.5mg weekly as needed. It would also be reasonable to consider adding a SGLT-2 inhibitor, as both these newer agents (SGLT2i and GLP1-RA) have cardiovascular benefits as well as glucose control. The goal is to transition to more guideline-based cardioprotective agents that will reduce the risk of recurrent CV events, while reducing the dependence on insulin. However, the patient reports intolerance to SGLT2i in the past, so would need to closely monitor if rechallenging.   Sleep Apnea: The patient is at low risk for sleep apnea.   Psychological Stress: The patient appears to be coping with stressors well at this time.     Thank you for the opportunity to see this patient. Please feel free to contact Prevention if there are any questions, or if you feel that your patient would benefit from continued follow-up visits with the Program.    Gissell Pearl, [facility]  Cardiovascular Prevention     Marianela Hylton MD  System Director, Cardiovascular Prevention

## 2021-05-05 NOTE — DIETITIAN INITIAL EVALUATION ADULT. - OTHER INFO
51M with a history of HTN, HLD, DM2 (A1C 8.7) and CAD s/p PCI presents with class III w/ severe 3 vessel CAD. Dr. Mehta reviewed the cardiac cath imaging and reports with the patient and his brother and discussed the case with Dr. Timmy Reynolds. Dr. Mehta discussed the risks, benefits and alternatives to surgery. Patient seen and evaluated by Dr. Mehta and deemed a good surgical candidate. On 5/3/21 patient underwent an uncomplicated OPCABx2 (LIMA to LAD, RA to OM), EF 50% with Dr. Mehta. On POD1, pleural tubes, josefa and yu removed; post pull cxr stable. POD2 remaining 2 mediastinal blakes removed, cxr stable.   Observed pt resting in bed, NAD. Denies n/v/d/c, chewing/ swallowing issues or pain impacting intake, skin is with 1+ generalized edema, MSI, incision to L wrist. NKFA or changes in wt endorsed. Discussed cst cho diet with pt, receptive. Continues to be managed post-op at this time. Will follow per protocol.

## 2021-05-05 NOTE — CHART NOTE - NSCHARTNOTEFT_GEN_A_CORE
CT Removal:    Pt seen and examined at bedside.  Case discussed with Dr. Read   Minimal output from CTs. No air leak appreciated.  CT removed without incident per Dr. Read request. Occlusive DSD placed. CXR no obvious PTX noted. Pt tolerated procedure well.

## 2021-05-05 NOTE — PROGRESS NOTE ADULT - ASSESSMENT
51 year old male with a history of HTN, HLD, DM and CAD s/p PCI presents with class III w/ severe 3 vessel CAD. Dr. Mehta reviewed the cardiac cath imaging and reports with the patient and his brother and discussed the case with Dr. Timmy Reynolds. Dr. Mehta discussed the risks, benefits and alternatives to surgery. Risks include but not limited to death, heart attack, bleeding, stroke, kidney problems and infection. He quoted a 1% operative mortality and complication risk. He also discussed the various approaches, minimally invasive versus sternotomy. Dr. Mehta feels the patient will benefit and is a candidate for off pump CABG. All questions were addressed.     Neuro:  -A&O, non focal  -PRN Percocet/ Tyelenol    Cardiac:  -s/p obcab.   -SR, start BB as tolerates.   -Con't ASA, Plavix, Statin.     Endo:  -Hg A1c 8.0  -Endo following. Lantus 10 u Qhs, Lispro 3 u TID, Lispro SS    GI:  -Tolerating Po's  -Bowel meds  -Protonix for DVT ppx.     Renal  -BUN/Cr 10/0.8  -Vital out today, TOV    Heme:  -Hg/Hct 10/29  -Hep SQ for dvt ppx    Dispo:  -Plan for home when medically ready 51 year old male with a history of HTN, HLD, DM and CAD s/p PCI presents with class III w/ severe 3 vessel CAD. Dr. Mehta reviewed the cardiac cath imaging and reports with the patient and his brother and discussed the case with Dr. Timmy Reynolds. Dr. Mehta discussed the risks, benefits and alternatives to surgery. Patient seen and evaluated by Dr. Mehta and deemed a good surgical candidate. On 5/3/21 patient underwent an uncomplicated OPCABx2 (LIMA to LAD, RA to OM), EF 50% with Dr. Mehta. On POD1, pleural tubes, josefa and yu removed; post pull cxr stable. POD2 remaining 2 mediastinal blakes removed, cxr stable.     Neuro:  -A&O, non focal  -PRN Percocet/ Tylenol    Cardiac:  -s/p OPCABx2  -SR, start BB as tolerates.   -Con't ASA, Plavix, Statin.     Endo:  -Hg A1c 8.0  -Endo following. Lantus 18 u Qhs, Lispro 6 u TID, Lispro SS    GI:  -Tolerating Po's  -Bowel meds  -Protonix for DVT ppx.     Renal  -BUN/Cr 10/0.8 (labs pending)  -Yu out today, TOV passed    Heme:  -Hg/Hct 10/29 (labs pending)  -Hep SQ for dvt ppx    Dispo:  -Plan for home when medically ready

## 2021-05-06 ENCOUNTER — TRANSCRIPTION ENCOUNTER (OUTPATIENT)
Age: 51
End: 2021-05-06

## 2021-05-06 VITALS
DIASTOLIC BLOOD PRESSURE: 62 MMHG | HEART RATE: 84 BPM | RESPIRATION RATE: 18 BRPM | OXYGEN SATURATION: 97 % | SYSTOLIC BLOOD PRESSURE: 118 MMHG

## 2021-05-06 PROBLEM — Z95.5 PRESENCE OF CORONARY ANGIOPLASTY IMPLANT AND GRAFT: Chronic | Status: ACTIVE | Noted: 2021-04-29

## 2021-05-06 LAB
ALBUMIN SERPL ELPH-MCNC: 3.4 G/DL — SIGNIFICANT CHANGE UP (ref 3.3–5)
ALP SERPL-CCNC: 87 U/L — SIGNIFICANT CHANGE UP (ref 40–120)
ALT FLD-CCNC: 55 U/L — HIGH (ref 10–45)
ANION GAP SERPL CALC-SCNC: 9 MMOL/L — SIGNIFICANT CHANGE UP (ref 5–17)
AST SERPL-CCNC: 53 U/L — HIGH (ref 10–40)
BILIRUB SERPL-MCNC: 0.7 MG/DL — SIGNIFICANT CHANGE UP (ref 0.2–1.2)
BUN SERPL-MCNC: 11 MG/DL — SIGNIFICANT CHANGE UP (ref 7–23)
CALCIUM SERPL-MCNC: 8.9 MG/DL — SIGNIFICANT CHANGE UP (ref 8.4–10.5)
CHLORIDE SERPL-SCNC: 102 MMOL/L — SIGNIFICANT CHANGE UP (ref 96–108)
CO2 SERPL-SCNC: 29 MMOL/L — SIGNIFICANT CHANGE UP (ref 22–31)
CREAT SERPL-MCNC: 0.96 MG/DL — SIGNIFICANT CHANGE UP (ref 0.5–1.3)
GLUCOSE BLDC GLUCOMTR-MCNC: 122 MG/DL — HIGH (ref 70–99)
GLUCOSE SERPL-MCNC: 123 MG/DL — HIGH (ref 70–99)
HCT VFR BLD CALC: 26.4 % — LOW (ref 39–50)
HGB BLD-MCNC: 8.7 G/DL — LOW (ref 13–17)
MAGNESIUM SERPL-MCNC: 1.9 MG/DL — SIGNIFICANT CHANGE UP (ref 1.6–2.6)
MCHC RBC-ENTMCNC: 29 PG — SIGNIFICANT CHANGE UP (ref 27–34)
MCHC RBC-ENTMCNC: 33 GM/DL — SIGNIFICANT CHANGE UP (ref 32–36)
MCV RBC AUTO: 88 FL — SIGNIFICANT CHANGE UP (ref 80–100)
NRBC # BLD: 0 /100 WBCS — SIGNIFICANT CHANGE UP (ref 0–0)
PLATELET # BLD AUTO: 128 K/UL — LOW (ref 150–400)
POTASSIUM SERPL-MCNC: 3.8 MMOL/L — SIGNIFICANT CHANGE UP (ref 3.5–5.3)
POTASSIUM SERPL-SCNC: 3.8 MMOL/L — SIGNIFICANT CHANGE UP (ref 3.5–5.3)
PROT SERPL-MCNC: 5.9 G/DL — LOW (ref 6–8.3)
RBC # BLD: 3 M/UL — LOW (ref 4.2–5.8)
RBC # FLD: 13.1 % — SIGNIFICANT CHANGE UP (ref 10.3–14.5)
SODIUM SERPL-SCNC: 140 MMOL/L — SIGNIFICANT CHANGE UP (ref 135–145)
WBC # BLD: 8.85 K/UL — SIGNIFICANT CHANGE UP (ref 3.8–10.5)
WBC # FLD AUTO: 8.85 K/UL — SIGNIFICANT CHANGE UP (ref 3.8–10.5)

## 2021-05-06 PROCEDURE — 36415 COLL VENOUS BLD VENIPUNCTURE: CPT

## 2021-05-06 PROCEDURE — 97162 PT EVAL MOD COMPLEX 30 MIN: CPT

## 2021-05-06 PROCEDURE — 83735 ASSAY OF MAGNESIUM: CPT

## 2021-05-06 PROCEDURE — 93005 ELECTROCARDIOGRAM TRACING: CPT

## 2021-05-06 PROCEDURE — 71045 X-RAY EXAM CHEST 1 VIEW: CPT

## 2021-05-06 PROCEDURE — 84295 ASSAY OF SERUM SODIUM: CPT

## 2021-05-06 PROCEDURE — 85025 COMPLETE CBC W/AUTO DIFF WBC: CPT

## 2021-05-06 PROCEDURE — 82962 GLUCOSE BLOOD TEST: CPT

## 2021-05-06 PROCEDURE — 71045 X-RAY EXAM CHEST 1 VIEW: CPT | Mod: 26

## 2021-05-06 PROCEDURE — 80048 BASIC METABOLIC PNL TOTAL CA: CPT

## 2021-05-06 PROCEDURE — 85027 COMPLETE CBC AUTOMATED: CPT

## 2021-05-06 PROCEDURE — 84132 ASSAY OF SERUM POTASSIUM: CPT

## 2021-05-06 PROCEDURE — 86850 RBC ANTIBODY SCREEN: CPT

## 2021-05-06 PROCEDURE — 86901 BLOOD TYPING SEROLOGIC RH(D): CPT

## 2021-05-06 PROCEDURE — 71046 X-RAY EXAM CHEST 2 VIEWS: CPT

## 2021-05-06 PROCEDURE — 85610 PROTHROMBIN TIME: CPT

## 2021-05-06 PROCEDURE — C1889: CPT

## 2021-05-06 PROCEDURE — 85730 THROMBOPLASTIN TIME PARTIAL: CPT

## 2021-05-06 PROCEDURE — 86900 BLOOD TYPING SEROLOGIC ABO: CPT

## 2021-05-06 PROCEDURE — 84100 ASSAY OF PHOSPHORUS: CPT

## 2021-05-06 PROCEDURE — 82803 BLOOD GASES ANY COMBINATION: CPT

## 2021-05-06 PROCEDURE — 86891 AUTOLOGOUS BLOOD OP SALVAGE: CPT

## 2021-05-06 PROCEDURE — 82330 ASSAY OF CALCIUM: CPT

## 2021-05-06 PROCEDURE — P9045: CPT

## 2021-05-06 PROCEDURE — 80053 COMPREHEN METABOLIC PANEL: CPT

## 2021-05-06 PROCEDURE — 86923 COMPATIBILITY TEST ELECTRIC: CPT

## 2021-05-06 RX ORDER — METOPROLOL TARTRATE 50 MG
0.5 TABLET ORAL
Qty: 30 | Refills: 0
Start: 2021-05-06 | End: 2021-06-04

## 2021-05-06 RX ORDER — MAGNESIUM OXIDE 400 MG ORAL TABLET 241.3 MG
800 TABLET ORAL ONCE
Refills: 0 | Status: COMPLETED | OUTPATIENT
Start: 2021-05-06 | End: 2021-05-06

## 2021-05-06 RX ORDER — ASPIRIN/CALCIUM CARB/MAGNESIUM 324 MG
1 TABLET ORAL
Qty: 0 | Refills: 0 | DISCHARGE

## 2021-05-06 RX ORDER — ACETAMINOPHEN 500 MG
2 TABLET ORAL
Qty: 240 | Refills: 0
Start: 2021-05-06 | End: 2021-06-04

## 2021-05-06 RX ORDER — DULAGLUTIDE 4.5 MG/.5ML
0.75 INJECTION, SOLUTION SUBCUTANEOUS
Qty: 3 | Refills: 0
Start: 2021-05-06 | End: 2021-06-04

## 2021-05-06 RX ORDER — ATORVASTATIN CALCIUM 80 MG/1
1 TABLET, FILM COATED ORAL
Qty: 0 | Refills: 0 | DISCHARGE

## 2021-05-06 RX ORDER — INSULIN DEGLUDEC 100 U/ML
18 INJECTION, SOLUTION SUBCUTANEOUS
Qty: 540 | Refills: 0
Start: 2021-05-06 | End: 2021-06-04

## 2021-05-06 RX ORDER — PANTOPRAZOLE SODIUM 20 MG/1
1 TABLET, DELAYED RELEASE ORAL
Qty: 30 | Refills: 0
Start: 2021-05-06 | End: 2021-06-04

## 2021-05-06 RX ORDER — ATORVASTATIN CALCIUM 80 MG/1
1 TABLET, FILM COATED ORAL
Qty: 30 | Refills: 0
Start: 2021-05-06 | End: 2021-06-04

## 2021-05-06 RX ORDER — ENOXAPARIN SODIUM 100 MG/ML
18 INJECTION SUBCUTANEOUS
Qty: 300 | Refills: 0
Start: 2021-05-06 | End: 2021-06-04

## 2021-05-06 RX ORDER — ISOPROPYL ALCOHOL, BENZOCAINE .7; .06 ML/ML; ML/ML
1 SWAB TOPICAL
Qty: 100 | Refills: 1
Start: 2021-05-06 | End: 2021-06-24

## 2021-05-06 RX ORDER — POTASSIUM CHLORIDE 20 MEQ
1 PACKET (EA) ORAL
Qty: 30 | Refills: 0
Start: 2021-05-06 | End: 2021-06-04

## 2021-05-06 RX ORDER — CLOPIDOGREL BISULFATE 75 MG/1
1 TABLET, FILM COATED ORAL
Qty: 30 | Refills: 0
Start: 2021-05-06 | End: 2021-06-04

## 2021-05-06 RX ORDER — POTASSIUM CHLORIDE 20 MEQ
40 PACKET (EA) ORAL ONCE
Refills: 0 | Status: COMPLETED | OUTPATIENT
Start: 2021-05-06 | End: 2021-05-06

## 2021-05-06 RX ORDER — DULAGLUTIDE 4.5 MG/.5ML
0 INJECTION, SOLUTION SUBCUTANEOUS
Qty: 0 | Refills: 0 | DISCHARGE

## 2021-05-06 RX ORDER — FUROSEMIDE 40 MG
1 TABLET ORAL
Qty: 30 | Refills: 0
Start: 2021-05-06 | End: 2021-06-04

## 2021-05-06 RX ORDER — EMPAGLIFLOZIN 10 MG/1
1 TABLET, FILM COATED ORAL
Qty: 30 | Refills: 0
Start: 2021-05-06 | End: 2021-06-04

## 2021-05-06 RX ORDER — METFORMIN HYDROCHLORIDE 850 MG/1
1 TABLET ORAL
Qty: 0 | Refills: 0 | DISCHARGE

## 2021-05-06 RX ORDER — POLYETHYLENE GLYCOL 3350 17 G/17G
17 POWDER, FOR SOLUTION ORAL
Qty: 119 | Refills: 0
Start: 2021-05-06 | End: 2021-05-12

## 2021-05-06 RX ORDER — METOPROLOL TARTRATE 50 MG
1 TABLET ORAL
Qty: 0 | Refills: 0 | DISCHARGE

## 2021-05-06 RX ORDER — ASPIRIN/CALCIUM CARB/MAGNESIUM 324 MG
1 TABLET ORAL
Qty: 30 | Refills: 0
Start: 2021-05-06 | End: 2021-06-04

## 2021-05-06 RX ORDER — METFORMIN HYDROCHLORIDE 850 MG/1
1 TABLET ORAL
Qty: 60 | Refills: 0
Start: 2021-05-06 | End: 2021-06-04

## 2021-05-06 RX ADMIN — Medication 8 UNIT(S): at 06:49

## 2021-05-06 RX ADMIN — CLOPIDOGREL BISULFATE 75 MILLIGRAM(S): 75 TABLET, FILM COATED ORAL at 11:31

## 2021-05-06 RX ADMIN — HEPARIN SODIUM 5000 UNIT(S): 5000 INJECTION INTRAVENOUS; SUBCUTANEOUS at 06:48

## 2021-05-06 RX ADMIN — Medication 1 TABLET(S): at 11:32

## 2021-05-06 RX ADMIN — SODIUM CHLORIDE 3 MILLILITER(S): 9 INJECTION INTRAMUSCULAR; INTRAVENOUS; SUBCUTANEOUS at 05:13

## 2021-05-06 RX ADMIN — MAGNESIUM OXIDE 400 MG ORAL TABLET 800 MILLIGRAM(S): 241.3 TABLET ORAL at 08:20

## 2021-05-06 RX ADMIN — Medication 81 MILLIGRAM(S): at 11:31

## 2021-05-06 RX ADMIN — PANTOPRAZOLE SODIUM 40 MILLIGRAM(S): 20 TABLET, DELAYED RELEASE ORAL at 06:48

## 2021-05-06 RX ADMIN — Medication 650 MILLIGRAM(S): at 05:00

## 2021-05-06 RX ADMIN — Medication 12.5 MILLIGRAM(S): at 06:48

## 2021-05-06 RX ADMIN — Medication 40 MILLIEQUIVALENT(S): at 08:20

## 2021-05-06 RX ADMIN — Medication 650 MILLIGRAM(S): at 05:30

## 2021-05-06 NOTE — DISCHARGE NOTE PROVIDER - HOSPITAL COURSE
51 year old male, with PMHx of HTN, HLD, DM, known 3V CAD s/p PCI 2/3/15 presented to his cardiologist complaining of chest pain at rest with HUBER x 2 weeks. He underwent cardiac cath on 4/19/21 revealing severe 3V CAD with ISR. He was seen as an outpatient by Dr. Mehta and deemed a surgical candidate. He presented to [facility] on [facility]/21 and underwent uncomplicated OPCAB x 2. Procedure uncomplicated and he was transferred to CT ICU post procedure intubated and stable. He remained stable, delined on POD#1 and transferred to . He continued to remain stable, ambulating on room air and as per Dr. Mehta is ready for discharge home on POD#3     35 minutes was spent with the patient reviewing the discharge material including medications, follow up appointments, recovery, concerning symptoms, and how to contact their health care providers if they have questions     CABG  Aspirin               [ x ] Yes  [  ] Contraindicated, Reason_______________________________  Beta-Blocker     [ x ] Yes  [  ]Contraindicated, Reason_______________________________  Statin                 [  x] Yes  [  ] Contraindicated, Reason_______________________________

## 2021-05-06 NOTE — DISCHARGE NOTE NURSING/CASE MANAGEMENT/SOCIAL WORK - NSDCPEFALRISK_GEN_ALL_CORE
Detail Level: Zone Patient information on fall and injury prevention Follow-Up Preamble: The following orders were made during the visit: follow up after application

## 2021-05-06 NOTE — PROGRESS NOTE ADULT - SUBJECTIVE AND OBJECTIVE BOX
INTERVAL HPI/OVERNIGHT EVENTS:    Patient is a 51y old  Male who presents with a chief complaint of CAD (06 May 2021 09:52)  BERTA.   good appetite.   planned for discharge.   denies any chest pain or SOB.       FSG & Insulin received:    Yesterday:  pre-dinner fs  nutritional lispro 8  units   bedtime fs  lantus 18  units    Today:122  pre-breakfast fsg:  nutritional lispro 8  units      Pt reports the following symptoms:    CONSTITUTIONAL:  Negative fever or chills, feels well, good appetite  EYES:  Negative  blurry vision or double vision  CARDIOVASCULAR:  Negative for chest pain or palpitations  RESPIRATORY:  Negative for cough, wheezing, or SOB   GASTROINTESTINAL:  Negative for nausea, vomiting, diarrhea, constipation, or abdominal pain  GENITOURINARY:  Negative frequency, urgency or dysuria  NEUROLOGIC:  No headache, confusion, dizziness, lightheadedness    MEDICATIONS  (STANDING):  aspirin enteric coated 81 milliGRAM(s) Oral daily  atorvastatin 80 milliGRAM(s) Oral at bedtime  clopidogrel Tablet 75 milliGRAM(s) Oral daily  dextrose 50% Injectable 12.5 Gram(s) IV Push once  dextrose 50% Injectable 25 Gram(s) IV Push once  glucagon  Injectable 1 milliGRAM(s) IntraMuscular once  heparin   Injectable 5000 Unit(s) SubCutaneous every 8 hours  insulin glargine Injectable (LANTUS) 18 Unit(s) SubCutaneous at bedtime  insulin lispro (ADMELOG) corrective regimen sliding scale   SubCutaneous Before meals and at bedtime  insulin lispro Injectable (ADMELOG) 8 Unit(s) SubCutaneous three times a day before meals  metoprolol tartrate 12.5 milliGRAM(s) Oral two times a day  multivitamin 1 Tablet(s) Oral daily  pantoprazole    Tablet 40 milliGRAM(s) Oral before breakfast  polyethylene glycol 3350 17 Gram(s) Oral daily  sodium chloride 0.9% lock flush 3 milliLiter(s) IV Push every 8 hours    MEDICATIONS  (PRN):  acetaminophen   Tablet .. 650 milliGRAM(s) Oral every 6 hours PRN Mild Pain (1 - 3)  oxycodone    5 mG/acetaminophen 325 mG 1 Tablet(s) Oral every 4 hours PRN Moderate Pain (4 - 6)  oxycodone    5 mG/acetaminophen 325 mG 2 Tablet(s) Oral every 6 hours PRN Severe Pain (7 - 10)      Past medical history reviewed  Family history reviewed  Social history reviewed    PHYSICAL EXAM  Vital Signs Last 24 Hrs  T(C): 36.4 (06 May 2021 05:12), Max: 37.9 (05 May 2021 21:01)  T(F): 97.6 (06 May 2021 05:12), Max: 100.2 (05 May 2021 21:01)  HR: 90 (06 May 2021 06:26) (90 - 112)  BP: 106/58 (06 May 2021 06:26) (97/51 - 118/58)  BP(mean): 77 (06 May 2021 06:26) (69 - 80)  RR: 18 (06 May 2021 06:26) (18 - 20)  SpO2: 97% (06 May 2021 06:26) (97% - 99%)    Constitutional: wn/wd in NAD.   HEENT: NCAT, MMM, OP clear, EOMI, no proptosis or lid retraction  Neck: no thyromegaly or palpable thyroid nodules   Respiratory: lungs CTAB.  Cardiovascular: regular rhythm, normal S1 and S2, no audible murmurs, no peripheral edema  GI: soft, NT/ND, no masses/HSM appreciated.  Neurology: no tremors, DTR 2+  Skin: no visible rashes/lesions  Psychiatric: AAO x 3, normal affect/mood.    LABS:                        8.7    8.85  )-----------( 128      ( 06 May 2021 06:04 )             26.4     05-06    140  |  102  |  11  ----------------------------<  123<H>  3.8   |  29  |  0.96    Ca    8.9      06 May 2021 06:04  Phos  3.5     05-04  Mg     1.9     05-06    TPro  5.9<L>  /  Alb  3.4  /  TBili  0.7  /  DBili  x   /  AST  53<H>  /  ALT  55<H>  /  AlkPhos  87  05-06        Thyroid Stimulating Hormone, Serum: 1.915 uIU/mL ( @ 15:37)      HbA1C: 8.7 % ( @ 15:38)      CAPILLARY BLOOD GLUCOSE      POCT Blood Glucose.: 122 mg/dL (06 May 2021 06:39)  POCT Blood Glucose.: 107 mg/dL (05 May 2021 21:43)  POCT Blood Glucose.: 146 mg/dL (05 May 2021 17:16)  POCT Blood Glucose.: 187 mg/dL (05 May 2021 13:01)  POCT Blood Glucose.: 213 mg/dL (05 May 2021 11:49)      Cholesterol, Serum: 107 mg/dL (21 @ 15:37)  HDL Cholesterol, Serum: 38 mg/dL (21 @ 15:37)  Triglycerides, Serum: 149 mg/dL (21 @ 15:37)      A/P: 52 yo male, Jakob/English speaking, presents with PMH of HTN, HLD, DM and family history of premature CAD(brother) with 3 vessel CAD. Patient initially presented in  with class III angina, s/p cardiac cath by that revealed 3 vessel CAD, s/p PCI/TAINA->mid lad. On 2/3/15 s/p stage PCI/TAINA-> mid LCX and mid RCA, patent mid LAD stent. He presented to his cardiologistwith new onset chest pain at rest and on exertion with accompanying HUBER over the past 2 weeks. S/p OPCAB on .   Endo consulted for DM management.     1. Uncontrolled type 2  DM  A1c: 8.7%  wt: 75, BMI:24.6  EF: 50%  cr/GFR: wnl  Please give lantus   18    units at night .  Please give lispro   8   units before each meal.    Please continue lispro moderate dose sliding scale four times daily with meals and at bedtime    2. HLD   LDL: 38 at goal   on lipitor     Pt's fingerstick glucose goal is 100-180    Will continue to monitor     For discharge, pt can continue  lantus   18    units at night, trulicity 0.75 weekly, Jardiance 10 mg daily and metformin     Pt can follow up at discharge with [facility] by calling  to make an appointment.    casediscussed with Dr. Keenan  and update primary team

## 2021-05-06 NOTE — DISCHARGE NOTE PROVIDER - NSDCMRMEDTOKEN_GEN_ALL_CORE_FT
acetaminophen 325 mg oral tablet: 2 tab(s) orally every 6 hours, As needed, Mild Pain (1 - 3)  alcohol swabs : Apply topically to affected area 4 times a day   atorvastatin 40 mg oral tablet: 1 tab(s) orally once a day  clopidogrel 75 mg oral tablet: 1 tab(s) orally once a day  Ecotrin Adult Low Strength 81 mg oral delayed release tablet: 1 tab(s) orally once a day  glucometer (per patient&#x27;s insurance): Test blood sugars four times a day. Dispense #1 glucometer.  glucose tablets: Follow instructions on bottle when sugar is low.  Insulin Pen Needles, 4mm: 1 application subcutaneously 4 times a day. ** Use with insulin pen **   Jardiance 10 mg oral tablet: 1 tab(s) orally once a day   lancets: 1 application subcutaneously 4 times a day   Lantus Solostar Pen 100 units/mL subcutaneous solution: 18 unit(s) subcutaneous once a day (at bedtime)   metFORMIN 1000 mg oral tablet: 1 tab(s) orally 2 times a day  metoprolol tartrate 25 mg oral tablet: 0.5 tab(s) orally every 12 hours   Multiple Vitamins oral tablet: 1 tab(s) orally once a day  oxycodone-acetaminophen 5 mg-325 mg oral tablet: 1 to 2 tab(s) orally every 6 hours, As needed, Severe Pain (7 - 10) MDD:8  pantoprazole 40 mg oral delayed release tablet: 1 tab(s) orally once a day (before a meal)  polyethylene glycol 3350 oral powder for reconstitution: 17 gram(s) orally once a day, As Needed   test strips (per patient&#x27;s insurance): 1 application subcutaneously 4 times a day. ** Compatible with patient&#x27;s glucometer **  Trulicity Pen 0.75 mg/0.5 mL subcutaneous solution: 0.75 milligram(s) subcutaneous once a week    acetaminophen 325 mg oral tablet: 2 tab(s) orally every 6 hours, As needed, Mild Pain (1 - 3)  alcohol swabs : Apply topically to affected area 4 times a day   atorvastatin 40 mg oral tablet: 1 tab(s) orally once a day  clopidogrel 75 mg oral tablet: 1 tab(s) orally once a day  Ecotrin Adult Low Strength 81 mg oral delayed release tablet: 1 tab(s) orally once a day  furosemide 20 mg oral tablet: 1 tab(s) orally once a day   glucometer (per patient&#x27;s insurance): Test blood sugars four times a day. Dispense #1 glucometer.  glucose tablets: Follow instructions on bottle when sugar is low.  Insulin Pen Needles, 4mm: 1 application subcutaneously 4 times a day. ** Use with insulin pen **   Jardiance 10 mg oral tablet: 1 tab(s) orally once a day   K-Tab 10 mEq oral tablet, extended release: 1 tab(s) orally once a day   lancets: 1 application subcutaneously 4 times a day   Lantus Solostar Pen 100 units/mL subcutaneous solution: 18 unit(s) subcutaneous once a day (at bedtime)   metFORMIN 1000 mg oral tablet: 1 tab(s) orally 2 times a day  metoprolol tartrate 25 mg oral tablet: 0.5 tab(s) orally every 12 hours   Multiple Vitamins oral tablet: 1 tab(s) orally once a day  oxycodone-acetaminophen 5 mg-325 mg oral tablet: 1 to 2 tab(s) orally every 6 hours, As needed, Severe Pain (7 - 10) MDD:8  pantoprazole 40 mg oral delayed release tablet: 1 tab(s) orally once a day (before a meal)  polyethylene glycol 3350 oral powder for reconstitution: 17 gram(s) orally once a day, As Needed   test strips (per patient&#x27;s insurance): 1 application subcutaneously 4 times a day. ** Compatible with patient&#x27;s glucometer **  Trulicity Pen 0.75 mg/0.5 mL subcutaneous solution: 0.75 milligram(s) subcutaneous once a week

## 2021-05-06 NOTE — DISCHARGE NOTE PROVIDER - NSDCHHNEEDSERVICE_GEN_ALL_CORE
Observation and assessment/Ostomy care and management/Rehabilitation services/Wound care and assessment

## 2021-05-06 NOTE — DISCHARGE NOTE PROVIDER - NSDCCPTREATMENT_GEN_ALL_CORE_FT
PRINCIPAL PROCEDURE  Procedure: OPCAB (off-pump coronary artery bypass)  Findings and Treatment:

## 2021-05-06 NOTE — PROGRESS NOTE ADULT - PROVIDER SPECIALTY LIST ADULT
Endocrinology
CT Surgery
Critical Care
Critical Care
CT Surgery
CT Surgery
Critical Care
Critical Care
Endocrinology

## 2021-05-06 NOTE — PROGRESS NOTE ADULT - SUBJECTIVE AND OBJECTIVE BOX
Patient discussed on morning rounds with Dr. Mehta      Operation / Date: 5/3/21 OPCAB x 2     Surgeon: Dr. Mehta     Referring Physician: Dr. Reynolds     SUBJECTIVE ASSESSMENT:  Patient seen this morning at bedside, doing well and not offering any complaints at this time. Denies any chest pain or shortness of breath. Has been ambulating without difficulty and using his incentive spirometer. Patient agreeable and ready for discharge home today.     Hospital Course:  51 year old male, with PMHx of HTN, HLD, DM, known 3V CAD s/p PCI 2/3/15 presented to his cardiologist complaining of chest pain at rest with HUBER x 2 weeks. He underwent cardiac cath on 4/19/21 revealing severe 3V CAD with ISR. He was seen as an outpatient by Dr. Mehta and deemed a surgical candidate. He presented to [facility] on 5/3/21 and underwent uncomplicated OPCAB x 2. Procedure uncomplicated and he was transferred to CT ICU post procedure intubated and stable. He remained stable, delined on POD#1 and transferred to . He continued to remain stable, ambulating on room air and as per Dr. Mehta is ready for discharge home on POD#3     35 minutes was spent with the patient reviewing the discharge material including medications, follow up appointments, recovery, concerning symptoms, and how to contact their health care providers if they have questions       Vital Signs Last 24 Hrs  T(C): 36.4 (06 May 2021 05:12), Max: 37.9 (05 May 2021 21:01)  T(F): 97.6 (06 May 2021 05:12), Max: 100.2 (05 May 2021 21:01)  HR: 90 (06 May 2021 06:26) (90 - 112)  BP: 106/58 (06 May 2021 06:26) (97/51 - 118/58)  BP(mean): 77 (06 May 2021 06:26) (69 - 80)  RR: 18 (06 May 2021 06:26) (18 - 20)  SpO2: 97% (06 May 2021 06:26) (97% - 99%)  I&O's Detail    05 May 2021 07:01  -  06 May 2021 07:00  --------------------------------------------------------  IN:    Oral Fluid: 575 mL  Total IN: 575 mL    OUT:    Bulb (mL): 675 mL  Total OUT: 675 mL    Total NET: -100 mL    EPICARDIAL WIRES REMOVED: Yes   TIE DOWNS REMOVED: Yes    PHYSICAL EXAM:    General: Patient lying comfortably in bed, no acute distress     Neurological: Alert and oriented. No focal neurological deficits     Cardiovascular: S1S2, RRR, no murmurs appreciated on exam     Respiratory: Clear to ausculation bilaterally     Gastrointestinal: Abdomen soft, non tender, non distended     Extremities: Warm and well perfused. No edema or calf tenderness     Vascular: Peripheral pulses 2+ bilaterally     Incision Sites: Sternotomy incision C/D/I , without any drainage or surrounding erythema   L Radial EVH site C/D/I, no hematoma     LABS:                        8.7    8.85  )-----------( 128      ( 06 May 2021 06:04 )             26.4       COUMADIN:  No        05-06    140  |  102  |  11  ----------------------------<  123<H>  3.8   |  29  |  0.96    Ca    8.9      06 May 2021 06:04  Mg     1.9     05-06    TPro  5.9<L>  /  Alb  3.4  /  TBili  0.7  /  DBili  x   /  AST  53<H>  /  ALT  55<H>  /  AlkPhos  87  05-06          MEDICATIONS  (STANDING): See Med Rec     Discharge CXR: z< from: Xray Chest 1 View- PORTABLE-Urgent (Xray Chest 1 View- PORTABLE-Urgent .) (05.03.21 @ 13:08) >  Impression:Postoperative changes as described above.    < end of copied text >    5/6/21 PA/LAt pending official read, no obvious ptx, effusions or infiltrates          Follow Up:  Additional Instructions	- Call us with any questions #[phone].    - You are being discharged home on insulin, please check your blood sugar four times per day (with meals and at bedtime). Please call your primary care doctor or endocrinologist if your blood sugar is consistently over 200 or under 70.     -Walk daily as tolerated and use your incentive spirometer every hour.    -No driving or strenuous activity/exercise for 6 weeks, or until cleared by your surgeon.    -Gently clean your incisions with anti-bacterial soap and water, pat dry.  You may leave them open to air.    -Call your doctor if you have shortness of breath, chest pain not relieved by pain medication, dizziness, fever >101.5, or increased redness or drainage from incisions.  Care Providers for Follow up (PCP/Outpatient Provider)	Elliott Mehta)  Cardiovascular Surgery  [address] [place] [address]  Phone: [phone]  Fax: [phone]  Scheduled Appointment: 05/11/2021 12:15 PM    Timmy Reynolds)  Cardiovascular Disease; Internal Medicine  [address] [place] [address]  Phone: [phone]  Fax: [phone]  Scheduled Appointment: 06/02/2021 09:00 AM    Endocrinology,   [facility]  [address] [address]  Phone: [phone]  Fax: (   )    -  Follow Up Time: 1 week  Patient's Scheduled Appointments	EMILY KENYON ; 05/11/2021 ; NPP CT Surg [address]   Additional Scheduled Appointments	- The office will call your with your endocrinology clinic appointment

## 2021-05-06 NOTE — CHART NOTE - NSCHARTNOTEFT_GEN_A_CORE
As per Dr. Mehta epicardial pacing wires removed at bedside. Ventricular epicardial wire cleansed with chlorhexidine and cut at skin level. Patient tolerated procedure well and remained hemodynamically stable.

## 2021-05-06 NOTE — DISCHARGE NOTE PROVIDER - PROVIDER TOKENS
PROVIDER:[TOKEN:[[id number]],SCHEDULEDAPPT:[05/11/2021],SCHEDULEDAPPTTIME:[12:15 PM]],PROVIDER:[TOKEN:[[id number]],SCHEDULEDAPPT:[06/02/2021],SCHEDULEDAPPTTIME:[09:00 AM]] PROVIDER:[TOKEN:[[id number]],SCHEDULEDAPPT:[05/11/2021],SCHEDULEDAPPTTIME:[12:15 PM]],PROVIDER:[TOKEN:[[id number]],SCHEDULEDAPPT:[06/02/2021],SCHEDULEDAPPTTIME:[09:00 AM]],FREE:[LAST:[Endocrinology],PHONE:[phone]],FAX:[(   )    -],ADDRESS:[[facility]  [address] [place] [address]],FOLLOWUP:[1 week]]

## 2021-05-06 NOTE — DISCHARGE NOTE NURSING/CASE MANAGEMENT/SOCIAL WORK - PATIENT PORTAL LINK FT
You can access the FollowMyHealth Patient Portal offered by [facility] by registering at the following website: [email or web address]. By joining [employer]’s FollowMyHealth portal, you will also be able to view your health information using other applications (apps) compatible with our system.

## 2021-05-06 NOTE — DISCHARGE NOTE PROVIDER - CARE PROVIDER_API CALL
Elliott Mehta)  Cardiovascular Surgery  [address] [place] [address]  Phone: [phone]  Fax: [phone]  Scheduled Appointment: 05/11/2021 12:15 PM    Timmy Reynolds)  Cardiovascular Disease; Internal Medicine  [address] [place] [address]  Phone: [phone]  Fax: [phone]  Scheduled Appointment: 06/02/2021 09:00 AM   Elliott Mehta)  Cardiovascular Surgery  [address] [place] [address]  Phone: [phone]  Fax: [phone]  Scheduled Appointment: 05/11/2021 12:15 PM    Timmy Reynolds)  Cardiovascular Disease; Internal Medicine  [address] [place] [address]  Phone: [phone]  Fax: [phone]  Scheduled Appointment: 06/02/2021 09:00 AM    Endocrinology,   [facility]  [address] [address]  Phone: [phone]  Fax: (   )    -  Follow Up Time: 1 week

## 2021-05-06 NOTE — DISCHARGE NOTE PROVIDER - NSDCFUADDINST_GEN_ALL_CORE_FT
- Call us with any questions #[phone].    - You are being discharged home on insulin, please check your blood sugar four times per day (with meals and at bedtime). Please call your primary care doctor or endocrinologist if your blood sugar is consistently over 200 or under 70.     -Walk daily as tolerated and use your incentive spirometer every hour.    -No driving or strenuous activity/exercise for 6 weeks, or until cleared by your surgeon.    -Gently clean your incisions with anti-bacterial soap and water, pat dry.  You may leave them open to air.    -Call your doctor if you have shortness of breath, chest pain not relieved by pain medication, dizziness, fever >101.5, or increased redness or drainage from incisions.

## 2021-05-06 NOTE — DISCHARGE NOTE PROVIDER - CARE PROVIDERS DIRECT ADDRESSES
,[email or web address],DirectAddress_Unknown ,[email or web address],DirectAddress_Unknown,DirectAddress_Unknown

## 2021-05-07 ENCOUNTER — NON-APPOINTMENT (OUTPATIENT)
Age: 51
End: 2021-05-07

## 2021-05-07 RX ORDER — POTASSIUM CHLORIDE 750 MG/1
10 TABLET, FILM COATED, EXTENDED RELEASE ORAL DAILY
Refills: 0 | Status: ACTIVE | COMMUNITY
Start: 2021-05-07

## 2021-05-07 RX ORDER — ASPIRIN 81 MG
81 TABLET, DELAYED RELEASE (ENTERIC COATED) ORAL DAILY
Refills: 5 | Status: ACTIVE | COMMUNITY

## 2021-05-07 RX ORDER — PANTOPRAZOLE 40 MG/1
40 TABLET, DELAYED RELEASE ORAL
Refills: 0 | Status: ACTIVE | COMMUNITY
Start: 2021-05-07

## 2021-05-07 RX ORDER — METOPROLOL TARTRATE 25 MG/1
25 TABLET, FILM COATED ORAL
Refills: 0 | Status: ACTIVE | COMMUNITY

## 2021-05-07 RX ORDER — LORATADINE 5 MG
17 TABLET,CHEWABLE ORAL
Refills: 0 | Status: ACTIVE | COMMUNITY
Start: 2021-05-07

## 2021-05-07 RX ORDER — CLOPIDOGREL 75 MG/1
75 TABLET, FILM COATED ORAL
Refills: 0 | Status: ACTIVE | COMMUNITY
Start: 2021-05-07

## 2021-05-07 RX ORDER — MULTIVITAMIN
TABLET ORAL DAILY
Refills: 0 | Status: ACTIVE | COMMUNITY
Start: 2021-05-07

## 2021-05-07 RX ORDER — DULAGLUTIDE 0.75 MG/.5ML
0.75 INJECTION, SOLUTION SUBCUTANEOUS
Refills: 3 | Status: ACTIVE | COMMUNITY
Start: 2021-05-07

## 2021-05-07 RX ORDER — FUROSEMIDE 20 MG/1
20 TABLET ORAL
Refills: 0 | Status: ACTIVE | COMMUNITY
Start: 2021-05-07

## 2021-05-07 RX ORDER — ACETAMINOPHEN 325 MG/1
325 TABLET ORAL EVERY 6 HOURS
Refills: 0 | Status: ACTIVE | COMMUNITY
Start: 2021-05-07

## 2021-05-07 RX ORDER — METFORMIN HYDROCHLORIDE 1000 MG/1
1000 TABLET, COATED ORAL TWICE DAILY
Qty: 60 | Refills: 2 | Status: ACTIVE | COMMUNITY

## 2021-05-07 RX ORDER — ATORVASTATIN CALCIUM 40 MG/1
40 TABLET, FILM COATED ORAL
Qty: 30 | Refills: 0 | Status: ACTIVE | COMMUNITY

## 2021-05-07 RX ORDER — OXYCODONE HYDROCHLORIDE AND ACETAMINOPHEN 5; 325 MG/1; MG/1
5-325 TABLET ORAL
Refills: 0 | Status: ACTIVE | COMMUNITY
Start: 2021-05-07

## 2021-05-09 DIAGNOSIS — Z09 ENCOUNTER FOR FOLLOW-UP EXAMINATION AFTER COMPLETED TREATMENT FOR CONDITIONS OTHER THAN MALIGNANT NEOPLASM: ICD-10-CM

## 2021-05-09 DIAGNOSIS — Z01.818 ENCOUNTER FOR OTHER PREPROCEDURAL EXAMINATION: ICD-10-CM

## 2021-05-09 DIAGNOSIS — Z95.1 PRESENCE OF AORTOCORONARY BYPASS GRAFT: ICD-10-CM

## 2021-05-10 ENCOUNTER — NON-APPOINTMENT (OUTPATIENT)
Age: 51
End: 2021-05-10

## 2021-05-11 ENCOUNTER — APPOINTMENT (OUTPATIENT)
Dept: CARDIOTHORACIC SURGERY | Facility: CLINIC | Age: 51
End: 2021-05-11
Payer: MEDICAID

## 2021-05-11 ENCOUNTER — OUTPATIENT (OUTPATIENT)
Dept: OUTPATIENT SERVICES | Facility: HOSPITAL | Age: 51
LOS: 1 days | End: 2021-05-11
Payer: COMMERCIAL

## 2021-05-11 VITALS
TEMPERATURE: 98.5 F | SYSTOLIC BLOOD PRESSURE: 116 MMHG | HEIGHT: 69 IN | OXYGEN SATURATION: 98 % | WEIGHT: 162 LBS | DIASTOLIC BLOOD PRESSURE: 65 MMHG | RESPIRATION RATE: 17 BRPM | HEART RATE: 111 BPM | BODY MASS INDEX: 23.99 KG/M2

## 2021-05-11 PROCEDURE — 99024 POSTOP FOLLOW-UP VISIT: CPT

## 2021-05-11 PROCEDURE — 71046 X-RAY EXAM CHEST 2 VIEWS: CPT | Mod: 26

## 2021-05-11 PROCEDURE — 71046 X-RAY EXAM CHEST 2 VIEWS: CPT

## 2021-05-12 ENCOUNTER — APPOINTMENT (OUTPATIENT)
Dept: CARE COORDINATION | Facility: HOME HEALTH | Age: 51
End: 2021-05-12
Payer: MEDICAID

## 2021-05-12 VITALS — WEIGHT: 163 LBS | BODY MASS INDEX: 24.07 KG/M2

## 2021-05-12 PROCEDURE — 99024 POSTOP FOLLOW-UP VISIT: CPT

## 2021-05-12 RX ORDER — EMPAGLIFLOZIN 10 MG/1
10 TABLET, FILM COATED ORAL DAILY
Refills: 0 | Status: DISCONTINUED | COMMUNITY
Start: 2021-05-07 | End: 2021-05-12

## 2021-05-12 RX ORDER — INSULIN GLARGINE 100 [IU]/ML
100 INJECTION, SOLUTION SUBCUTANEOUS AT BEDTIME
Refills: 0 | Status: ACTIVE | COMMUNITY
Start: 2021-05-07

## 2021-05-12 RX ORDER — GLIMEPIRIDE 1 MG/1
1 TABLET ORAL DAILY
Refills: 0 | Status: ACTIVE | COMMUNITY
Start: 2021-05-12

## 2021-05-12 NOTE — PHYSICAL EXAM
[Sclera] : the sclera and conjunctiva were normal [Extraocular Movements] : extraocular movements were intact [Neck Appearance] : the appearance of the neck was normal [] : no respiratory distress [Respiration, Rhythm And Depth] : normal respiratory rhythm and effort [Exaggerated Use Of Accessory Muscles For Inspiration] : no accessory muscle use [Examination Of The Chest] : the chest was normal in appearance [Chest Visual Inspection Thoracic Asymmetry] : no chest asymmetry [Diminished Respiratory Excursion] : normal chest expansion [No Focal Deficits] : no focal deficits [Oriented To Time, Place, And Person] : oriented to person, place, and time [FreeTextEntry1] : Left RAG site healing well.

## 2021-05-12 NOTE — HISTORY OF PRESENT ILLNESS
[FreeTextEntry1] : As per EMR:\[facility] Hospital Course: 51 year old male, with PMHx of HTN, HLD, DM, known 3V CAD s/p PCI 2/3/15 presented to his cardiologist complaining of chest pain at rest with HUBER x 2 weeks. He underwent cardiac cath on 4/19/21 revealing severe 3V CAD with ISR. He was seen as an outpatient by Dr. Mehta and deemed a surgical candidate. He presented to [facility] on 5/3/21 and underwent uncomplicated OPCAB x 2. Procedure uncomplicated and he was transferred to CT ICU post procedure intubated and stable. He remained stable, delined on POD#1 and transferred to . He continued to remain stable, ambulating on room air and as per Dr. Mehta is ready for discharge home on POD#3. \[facility] \cris Pt. was discharged home on 5/6/21. Labs & studies reviewed.\[facility] \[facility] Pt. is seen today via telemedicine in routine f/u. Although pt. had stated he was taking all of his prescribed medications, today he reports that insurance didn't cover Jardiance.  this am. Pt. states yesterday afternoon FS was in the 300's. Pt. denies any fever, chills, malaise or s/s infection to incisions. He stated that he resumed Glimepiride 1 mg po qd as prior to surgery this am. He was seen by Dr. Mehta yesterday & CXR had shown bilateral pleural effusions and atelectasis.\[facility] \[facility] Pt. stated he was going to call his PCP today for an appt. I requested he contact me after appt. to see if PCP wants to c/w Jardiance & if so I will call for PA.

## 2021-05-12 NOTE — REVIEW OF SYSTEMS
[SOB on Exertion] : shortness of breath during exertion [Negative] : Heme/Lymph [Limb Pain] : no limb pain [Limb Swelling] : no limb swelling [FreeTextEntry9] : Denies numbness/tingling to LUE.

## 2021-05-12 NOTE — ASSESSMENT
[FreeTextEntry1] : WDWN, middle-aged male doing well s/p OP CABG x 2.\par \par Problems:\par 1. HTN/HLD/CAD (s/p PCI): s/p OP CABG x 2.\par c/w daily weights, temps and showers.\par Continue with current meds: Atorvastatin, ASA, Plavix, Metoprolol, Lasix & KCL.\par c/w Tylenol/Percocet PRN pain.\par c/w Miralax PRN constipation.\par c/w Pantoprazole for GI ppx.\par Keep legs elevated.\par Incentive spirometry.\par Homecare- [facility].\par Diet- low salt, low fat.\par f/u appts - CTS, Cardiology & PCP.\par [facility] team will continue to f/u with pt status. \par Pt agrees to call with any questions, issues or concerns.\par \par \par 2. DM Type II, control not optimized: Hg A1c 8.7%, FS >200\par Education provided on diabetic diet & Hg A1C goal of less than 7% & continued vascular & organ risk due to uncontrolled DM.\par c/w Glimepiride, Lantus, Trulicity & Metformin\par f/u with PCP\par Pt. to call if PCP wants him to c/w Jardiance, if so will try to obtain auth.\par Recommended endocrinology consult, pt. has an MD he would like to make appt. with.\par \par

## 2021-05-13 DIAGNOSIS — I10 ESSENTIAL (PRIMARY) HYPERTENSION: ICD-10-CM

## 2021-05-13 DIAGNOSIS — E78.5 HYPERLIPIDEMIA, UNSPECIFIED: ICD-10-CM

## 2021-05-13 DIAGNOSIS — I25.118 ATHEROSCLEROTIC HEART DISEASE OF NATIVE CORONARY ARTERY WITH OTHER FORMS OF ANGINA PECTORIS: ICD-10-CM

## 2021-05-13 DIAGNOSIS — I25.10 ATHEROSCLEROTIC HEART DISEASE OF NATIVE CORONARY ARTERY WITHOUT ANGINA PECTORIS: ICD-10-CM

## 2021-05-13 DIAGNOSIS — E11.65 TYPE 2 DIABETES MELLITUS WITH HYPERGLYCEMIA: ICD-10-CM

## 2021-05-13 DIAGNOSIS — Z95.5 PRESENCE OF CORONARY ANGIOPLASTY IMPLANT AND GRAFT: ICD-10-CM

## 2021-05-25 ENCOUNTER — APPOINTMENT (OUTPATIENT)
Dept: CARDIOTHORACIC SURGERY | Facility: CLINIC | Age: 51
End: 2021-05-25
Payer: MEDICAID

## 2021-05-25 ENCOUNTER — OUTPATIENT (OUTPATIENT)
Dept: OUTPATIENT SERVICES | Facility: HOSPITAL | Age: 51
LOS: 1 days | End: 2021-05-25
Payer: COMMERCIAL

## 2021-05-25 ENCOUNTER — RESULT REVIEW (OUTPATIENT)
Age: 51
End: 2021-05-25

## 2021-05-25 VITALS
TEMPERATURE: 97.7 F | HEIGHT: 69 IN | RESPIRATION RATE: 17 BRPM | WEIGHT: 153 LBS | OXYGEN SATURATION: 99 % | BODY MASS INDEX: 22.66 KG/M2 | DIASTOLIC BLOOD PRESSURE: 66 MMHG | HEART RATE: 99 BPM | SYSTOLIC BLOOD PRESSURE: 107 MMHG

## 2021-05-25 PROCEDURE — 71046 X-RAY EXAM CHEST 2 VIEWS: CPT

## 2021-05-25 PROCEDURE — 99024 POSTOP FOLLOW-UP VISIT: CPT

## 2021-05-25 PROCEDURE — 71046 X-RAY EXAM CHEST 2 VIEWS: CPT | Mod: 26

## 2021-05-25 RX ORDER — METFORMIN HYDROCHLORIDE 1000 MG/1
1000 TABLET, COATED ORAL
Refills: 3 | Status: COMPLETED | COMMUNITY
Start: 2021-05-07 | End: 2021-05-25

## 2021-06-04 ENCOUNTER — TRANSCRIPTION ENCOUNTER (OUTPATIENT)
Age: 51
End: 2021-06-04

## 2022-07-14 VITALS
DIASTOLIC BLOOD PRESSURE: 80 MMHG | TEMPERATURE: 98 F | RESPIRATION RATE: 18 BRPM | WEIGHT: 173.94 LBS | HEART RATE: 80 BPM | SYSTOLIC BLOOD PRESSURE: 146 MMHG | OXYGEN SATURATION: 98 %

## 2022-07-14 RX ORDER — DULAGLUTIDE 4.5 MG/.5ML
0 INJECTION, SOLUTION SUBCUTANEOUS
Qty: 0 | Refills: 0 | DISCHARGE

## 2022-07-14 RX ORDER — METFORMIN HYDROCHLORIDE 850 MG/1
1 TABLET ORAL
Qty: 0 | Refills: 0 | DISCHARGE

## 2022-07-14 RX ORDER — ATORVASTATIN CALCIUM 80 MG/1
1 TABLET, FILM COATED ORAL
Qty: 0 | Refills: 0 | DISCHARGE

## 2022-07-14 RX ORDER — ASPIRIN/CALCIUM CARB/MAGNESIUM 324 MG
1 TABLET ORAL
Qty: 0 | Refills: 0 | DISCHARGE

## 2022-07-14 RX ORDER — METOPROLOL TARTRATE 50 MG
1 TABLET ORAL
Qty: 0 | Refills: 0 | DISCHARGE

## 2022-07-14 NOTE — H&P ADULT - NSHPLABSRESULTS_GEN_ALL_CORE
16.3   7.18  )-----------( 184      ( 26 Jul 2022 11:12 )             48.5 16.3   7.18  )-----------( 184      ( 26 Jul 2022 11:12 )             48.5    07-26    139  |  101  |  15  ----------------------------<  211<H>  4.3   |  27  |  0.81    Ca    9.4      26 Jul 2022 11:32    TPro  7.3  /  Alb  5.0  /  TBili  0.6  /  DBili  x   /  AST  19  /  ALT  24  /  AlkPhos  88  07-26

## 2022-07-14 NOTE — H&P ADULT - HISTORY OF PRESENT ILLNESS
COVID:    Cardiologist: Dr. Reynolds   Pharmacy:   Escort:      51 yo male w/ PMHx of HTN, HLD, Type II DM, CAD s/p 3 stents @ [facility] (in 2014) s/p CABG (LIMA to LAD, left radial to OM @ [facility] in 5/2021) presented to his cardiologist, Dr. Reynolds c/o chest pain and epigastric pain at rest. Denies SOB, palpitations, orthopnea, PND, LE edema. NST 6/7/2022: moderate sized, severe reversible, myocardial perfusion defect of the anterolateral and lateral wall consistent with ischemia in this region. There is a small sized, reversible, myocardial perfusion defect of the anterolateral and lateral wall consistent with ischemia in this region. EF 60%. ECHO per MD note reviewed normal EF without valvular abnormalities.      In light of risk factors, CCS angina class IV symptoms, and abnormal NST result, pt recommended for cardiac angiogram with possible intervention if clinically indicated.  COVID:  Neg 7/22 in [facility]  Cardiologist: Dr. Reynolds   Pharmacy:   Escort:      *Confirm meds on arrival  53 y/o Male w/ PMHx of HTN, HLD, Type II DM, CAD s/p 3 stents @ [facility] (in 2014) s/p CABG (LIMA to LAD, left radial to OM @ [facility] in 5/2021) presented to his cardiologist, Dr. Reynolds c/o chest pain and epigastric pain at rest. Denies SOB, palpitations, orthopnea, PND, LE edema. NST 6/7/2022: moderate sized, severe reversible, myocardial perfusion defect of the anterolateral and lateral wall consistent with ischemia in this region. There is a small sized, reversible, myocardial perfusion defect of the anterolateral and lateral wall consistent with ischemia in this region. EF 60%. ECHO per MD note reviewed normal EF without valvular abnormalities.  In light of risk factors, CCS angina class IV symptoms, and abnormal NST result, pt recommended for cardiac angiogram with possible intervention if clinically indicated.  COVID:  Neg 7/22 in [facility]  Cardiologist: Dr. Reynolds   Pharmacy:   Escort:      *Confirm meds on arrival  53 y/o Male w/ PMHx of HTN, HLD, NIDDM, CAD s/p 3 stents @ [facility] (in 2014) s/p CABG (LIMA to LAD, left radial to OM @ [facility] in 5/2021) presented to his Cardiologist, Dr. Reynolds c/o chest pain and epigastric pain at rest. Denies SOB, palpitations, orthopnea, PND, LE edema. NST 6/7/2022: moderate sized, severe reversible, myocardial perfusion defect of the anterolateral and lateral wall consistent with ischemia in this region. There is a small sized, reversible, myocardial perfusion defect of the anterolateral and lateral wall consistent with ischemia in this region. EF 60%. ECHO per MD note reviewed normal EF without valvular abnormalities.  In light of risk factors, CCS angina class IV symptoms, and abnormal NST result, pt recommended for cardiac angiogram with possible intervention if clinically indicated.

## 2022-07-14 NOTE — H&P ADULT - ASSESSMENT
51 y/o Male w/ PMHx of HTN, HLD, NIDDM, CAD s/p 3 stents @ [facility] (in 2014) s/p CABG (LIMA to LAD, left radial to OM @ [facility] in 5/2021) who in light of risk factors, CCS angina class IV symptoms, and abnormal NST result, pt recommended for cardiac angiogram with possible intervention if clinically indicated.    EKG: 			  ASA 3			Mallampati class: 2         Anginal Class: 3    -No Known Allergies  -H/H = 16.3/48.5. Pt denies BRBPR, hematuria, hematochezia, melena. Pt reports good compliance w/ home ASA 81mg QD, stating his last dose was today. Pt loaded w/ Plavix 600mg PO x1 pre-cath.  -BUN/Cr = ________. EF normal. Euvolemic on exam. IV NS 250cc bolus followed by 75cc/hr x2hrs ordered pre procedure    Of note, pt took their home Metformin 500mg PO the morning of the procedure; d/w IC fellow Dr. Purcell.    Sedation Plan:   Moderate  Patient Is Suitable Candidate For Sedation?     Yes    Risks & benefits of procedure and alternative therapy have been explained to the patient including but not limited to: allergic reaction, bleeding with possible need for blood transfusion, infection, renal and vascular compromise, limb damage, arrhythmia, stroke, vessel dissection/perforation, myocardial infarction, and emergent CABG. Informed consent obtained at bedside and included in chart.     51 y/o Male w/ PMHx of HTN, HLD, NIDDM, CAD s/p 3 stents @ [facility] (in 2014) s/p CABG (LIMA to LAD, left radial to OM @ [facility] in 5/2021) who in light of risk factors, CCS angina class IV symptoms, and abnormal NST result, pt recommended for cardiac angiogram with possible intervention if clinically indicated.    EKG: NSR @86bpm w/o ischemic changes.	  ASA 3			Mallampati class: 2         Anginal Class: 3    -No Known Allergies  -H/H = 16.3/48.5. Pt denies BRBPR, hematuria, hematochezia, melena. Pt reports good compliance w/ home ASA 81mg QD, stating his last dose was today. Pt loaded w/ Plavix 600mg PO x1 pre-cath.  -Cr 0.81. EF normal. Euvolemic on exam. IV NS 250cc bolus followed by 75cc/hr x2hrs ordered pre procedure    Of note, pt took their home Metformin 500mg PO the morning of the procedure; d/w IC fellow Dr. Purcell.    Sedation Plan:   Moderate  Patient Is Suitable Candidate For Sedation?     Yes    Risks & benefits of procedure and alternative therapy have been explained to the patient including but not limited to: allergic reaction, bleeding with possible need for blood transfusion, infection, renal and vascular compromise, limb damage, arrhythmia, stroke, vessel dissection/perforation, myocardial infarction, and emergent CABG. Informed consent obtained at bedside and included in chart.

## 2022-07-21 RX ORDER — CHLORHEXIDINE GLUCONATE 213 G/1000ML
1 SOLUTION TOPICAL ONCE
Refills: 0 | Status: DISCONTINUED | OUTPATIENT
Start: 2022-07-26 | End: 2022-07-26

## 2022-07-26 ENCOUNTER — OUTPATIENT (OUTPATIENT)
Dept: OUTPATIENT SERVICES | Facility: HOSPITAL | Age: 52
LOS: 1 days | Discharge: HOME | End: 2022-07-26
Payer: COMMERCIAL

## 2022-07-26 DIAGNOSIS — Z95.1 PRESENCE OF AORTOCORONARY BYPASS GRAFT: Chronic | ICD-10-CM

## 2022-07-26 LAB
A1C WITH ESTIMATED AVERAGE GLUCOSE RESULT: 9 % — HIGH (ref 4–5.6)
ALBUMIN SERPL ELPH-MCNC: 5 G/DL — SIGNIFICANT CHANGE UP (ref 3.3–5)
ALP SERPL-CCNC: 88 U/L — SIGNIFICANT CHANGE UP (ref 40–120)
ALT FLD-CCNC: 24 U/L — SIGNIFICANT CHANGE UP (ref 10–45)
ANION GAP SERPL CALC-SCNC: 11 MMOL/L — SIGNIFICANT CHANGE UP (ref 5–17)
APTT BLD: 31.7 SEC — SIGNIFICANT CHANGE UP (ref 27.5–35.5)
AST SERPL-CCNC: 19 U/L — SIGNIFICANT CHANGE UP (ref 10–40)
BASOPHILS # BLD AUTO: 0.05 K/UL — SIGNIFICANT CHANGE UP (ref 0–0.2)
BASOPHILS NFR BLD AUTO: 0.7 % — SIGNIFICANT CHANGE UP (ref 0–2)
BILIRUB SERPL-MCNC: 0.6 MG/DL — SIGNIFICANT CHANGE UP (ref 0.2–1.2)
BUN SERPL-MCNC: 15 MG/DL — SIGNIFICANT CHANGE UP (ref 7–23)
CALCIUM SERPL-MCNC: 9.4 MG/DL — SIGNIFICANT CHANGE UP (ref 8.4–10.5)
CHLORIDE SERPL-SCNC: 101 MMOL/L — SIGNIFICANT CHANGE UP (ref 96–108)
CHOLEST SERPL-MCNC: 134 MG/DL — SIGNIFICANT CHANGE UP
CK MB CFR SERPL CALC: 1.8 NG/ML — SIGNIFICANT CHANGE UP (ref 0–6.7)
CK SERPL-CCNC: 144 U/L — SIGNIFICANT CHANGE UP (ref 30–200)
CO2 SERPL-SCNC: 27 MMOL/L — SIGNIFICANT CHANGE UP (ref 22–31)
CREAT SERPL-MCNC: 0.81 MG/DL — SIGNIFICANT CHANGE UP (ref 0.5–1.3)
EGFR: 107 ML/MIN/1.73M2 — SIGNIFICANT CHANGE UP
EOSINOPHIL # BLD AUTO: 0.21 K/UL — SIGNIFICANT CHANGE UP (ref 0–0.5)
EOSINOPHIL NFR BLD AUTO: 2.9 % — SIGNIFICANT CHANGE UP (ref 0–6)
ESTIMATED AVERAGE GLUCOSE: 212 MG/DL — HIGH (ref 68–114)
GLUCOSE BLDC GLUCOMTR-MCNC: 148 MG/DL — HIGH (ref 70–99)
GLUCOSE SERPL-MCNC: 211 MG/DL — HIGH (ref 70–99)
HCT VFR BLD CALC: 48.5 % — SIGNIFICANT CHANGE UP (ref 39–50)
HDLC SERPL-MCNC: 39 MG/DL — LOW
HGB BLD-MCNC: 16.3 G/DL — SIGNIFICANT CHANGE UP (ref 13–17)
IMM GRANULOCYTES NFR BLD AUTO: 0.1 % — SIGNIFICANT CHANGE UP (ref 0–1.5)
INR BLD: 0.87 — LOW (ref 0.88–1.16)
ISTAT INR: 1.1 — SIGNIFICANT CHANGE UP (ref 0.88–1.16)
ISTAT PT: 13 SEC — HIGH (ref 10–12.9)
LIPID PNL WITH DIRECT LDL SERPL: 50 MG/DL — SIGNIFICANT CHANGE UP
LYMPHOCYTES # BLD AUTO: 2.16 K/UL — SIGNIFICANT CHANGE UP (ref 1–3.3)
LYMPHOCYTES # BLD AUTO: 30.1 % — SIGNIFICANT CHANGE UP (ref 13–44)
MCHC RBC-ENTMCNC: 28.8 PG — SIGNIFICANT CHANGE UP (ref 27–34)
MCHC RBC-ENTMCNC: 33.6 GM/DL — SIGNIFICANT CHANGE UP (ref 32–36)
MCV RBC AUTO: 85.8 FL — SIGNIFICANT CHANGE UP (ref 80–100)
MONOCYTES # BLD AUTO: 0.68 K/UL — SIGNIFICANT CHANGE UP (ref 0–0.9)
MONOCYTES NFR BLD AUTO: 9.5 % — SIGNIFICANT CHANGE UP (ref 2–14)
NEUTROPHILS # BLD AUTO: 4.07 K/UL — SIGNIFICANT CHANGE UP (ref 1.8–7.4)
NEUTROPHILS NFR BLD AUTO: 56.7 % — SIGNIFICANT CHANGE UP (ref 43–77)
NON HDL CHOLESTEROL: 95 MG/DL — SIGNIFICANT CHANGE UP
NRBC # BLD: 0 /100 WBCS — SIGNIFICANT CHANGE UP (ref 0–0)
PLATELET # BLD AUTO: 184 K/UL — SIGNIFICANT CHANGE UP (ref 150–400)
POCT ISTAT CREATININE: 0.8 MG/DL — SIGNIFICANT CHANGE UP (ref 0.5–1.3)
POTASSIUM SERPL-MCNC: 4.3 MMOL/L — SIGNIFICANT CHANGE UP (ref 3.5–5.3)
POTASSIUM SERPL-SCNC: 4.3 MMOL/L — SIGNIFICANT CHANGE UP (ref 3.5–5.3)
PROT SERPL-MCNC: 7.3 G/DL — SIGNIFICANT CHANGE UP (ref 6–8.3)
PROTHROM AB SERPL-ACNC: 10.3 SEC — LOW (ref 10.5–13.4)
RBC # BLD: 5.65 M/UL — SIGNIFICANT CHANGE UP (ref 4.2–5.8)
RBC # FLD: 12.4 % — SIGNIFICANT CHANGE UP (ref 10.3–14.5)
SODIUM SERPL-SCNC: 139 MMOL/L — SIGNIFICANT CHANGE UP (ref 135–145)
TRIGL SERPL-MCNC: 227 MG/DL — HIGH
WBC # BLD: 7.18 K/UL — SIGNIFICANT CHANGE UP (ref 3.8–10.5)
WBC # FLD AUTO: 7.18 K/UL — SIGNIFICANT CHANGE UP (ref 3.8–10.5)

## 2022-07-26 PROCEDURE — 80053 COMPREHEN METABOLIC PANEL: CPT

## 2022-07-26 PROCEDURE — 99152 MOD SED SAME PHYS/QHP 5/>YRS: CPT

## 2022-07-26 PROCEDURE — 85610 PROTHROMBIN TIME: CPT

## 2022-07-26 PROCEDURE — 93005 ELECTROCARDIOGRAM TRACING: CPT

## 2022-07-26 PROCEDURE — 80061 LIPID PANEL: CPT

## 2022-07-26 PROCEDURE — 93459 L HRT ART/GRFT ANGIO: CPT | Mod: 26

## 2022-07-26 PROCEDURE — 93010 ELECTROCARDIOGRAM REPORT: CPT

## 2022-07-26 PROCEDURE — 93459 L HRT ART/GRFT ANGIO: CPT

## 2022-07-26 PROCEDURE — C1887: CPT

## 2022-07-26 PROCEDURE — 83036 HEMOGLOBIN GLYCOSYLATED A1C: CPT

## 2022-07-26 PROCEDURE — 85730 THROMBOPLASTIN TIME PARTIAL: CPT

## 2022-07-26 PROCEDURE — C1769: CPT

## 2022-07-26 PROCEDURE — C1894: CPT

## 2022-07-26 PROCEDURE — 36415 COLL VENOUS BLD VENIPUNCTURE: CPT

## 2022-07-26 PROCEDURE — 82565 ASSAY OF CREATININE: CPT

## 2022-07-26 PROCEDURE — 82550 ASSAY OF CK (CPK): CPT

## 2022-07-26 PROCEDURE — 82962 GLUCOSE BLOOD TEST: CPT

## 2022-07-26 PROCEDURE — 82553 CREATINE MB FRACTION: CPT

## 2022-07-26 PROCEDURE — C1760: CPT

## 2022-07-26 PROCEDURE — 85025 COMPLETE CBC W/AUTO DIFF WBC: CPT

## 2022-07-26 RX ORDER — SODIUM CHLORIDE 9 MG/ML
1000 INJECTION, SOLUTION INTRAVENOUS
Refills: 0 | Status: DISCONTINUED | OUTPATIENT
Start: 2022-07-26 | End: 2022-08-09

## 2022-07-26 RX ORDER — DEXTROSE 50 % IN WATER 50 %
15 SYRINGE (ML) INTRAVENOUS ONCE
Refills: 0 | Status: DISCONTINUED | OUTPATIENT
Start: 2022-07-26 | End: 2022-08-09

## 2022-07-26 RX ORDER — CLOPIDOGREL BISULFATE 75 MG/1
1 TABLET, FILM COATED ORAL
Qty: 30 | Refills: 11
Start: 2022-07-26 | End: 2023-07-20

## 2022-07-26 RX ORDER — ATORVASTATIN CALCIUM 80 MG/1
1 TABLET, FILM COATED ORAL
Qty: 30 | Refills: 2
Start: 2022-07-26 | End: 2022-10-23

## 2022-07-26 RX ORDER — AMLODIPINE BESYLATE 2.5 MG/1
1 TABLET ORAL
Qty: 30 | Refills: 2
Start: 2022-07-26 | End: 2022-10-23

## 2022-07-26 RX ORDER — GLUCAGON INJECTION, SOLUTION 0.5 MG/.1ML
1 INJECTION, SOLUTION SUBCUTANEOUS ONCE
Refills: 0 | Status: DISCONTINUED | OUTPATIENT
Start: 2022-07-26 | End: 2022-08-09

## 2022-07-26 RX ORDER — DEXTROSE 50 % IN WATER 50 %
12.5 SYRINGE (ML) INTRAVENOUS ONCE
Refills: 0 | Status: DISCONTINUED | OUTPATIENT
Start: 2022-07-26 | End: 2022-08-09

## 2022-07-26 RX ORDER — CLOPIDOGREL BISULFATE 75 MG/1
600 TABLET, FILM COATED ORAL ONCE
Refills: 0 | Status: COMPLETED | OUTPATIENT
Start: 2022-07-26 | End: 2022-07-26

## 2022-07-26 RX ORDER — DEXTROSE 50 % IN WATER 50 %
25 SYRINGE (ML) INTRAVENOUS ONCE
Refills: 0 | Status: DISCONTINUED | OUTPATIENT
Start: 2022-07-26 | End: 2022-08-09

## 2022-07-26 RX ORDER — SODIUM CHLORIDE 9 MG/ML
500 INJECTION INTRAMUSCULAR; INTRAVENOUS; SUBCUTANEOUS
Refills: 0 | Status: DISCONTINUED | OUTPATIENT
Start: 2022-07-26 | End: 2022-08-09

## 2022-07-26 RX ORDER — SODIUM CHLORIDE 9 MG/ML
500 INJECTION INTRAMUSCULAR; INTRAVENOUS; SUBCUTANEOUS
Refills: 0 | Status: DISCONTINUED | OUTPATIENT
Start: 2022-07-26 | End: 2022-07-26

## 2022-07-26 RX ORDER — INSULIN LISPRO 100/ML
VIAL (ML) SUBCUTANEOUS ONCE
Refills: 0 | Status: DISCONTINUED | OUTPATIENT
Start: 2022-07-26 | End: 2022-08-09

## 2022-07-26 RX ORDER — SODIUM CHLORIDE 9 MG/ML
250 INJECTION INTRAMUSCULAR; INTRAVENOUS; SUBCUTANEOUS ONCE
Refills: 0 | Status: COMPLETED | OUTPATIENT
Start: 2022-07-26 | End: 2022-07-26

## 2022-07-26 RX ORDER — ASPIRIN/CALCIUM CARB/MAGNESIUM 324 MG
1 TABLET ORAL
Qty: 30 | Refills: 11
Start: 2022-07-26 | End: 2023-07-20

## 2022-07-26 RX ADMIN — SODIUM CHLORIDE 250 MILLILITER(S): 9 INJECTION INTRAMUSCULAR; INTRAVENOUS; SUBCUTANEOUS at 15:52

## 2022-07-26 RX ADMIN — SODIUM CHLORIDE 75 MILLILITER(S): 9 INJECTION INTRAMUSCULAR; INTRAVENOUS; SUBCUTANEOUS at 12:26

## 2022-07-26 RX ADMIN — SODIUM CHLORIDE 1000 MILLILITER(S): 9 INJECTION INTRAMUSCULAR; INTRAVENOUS; SUBCUTANEOUS at 12:26

## 2022-07-26 RX ADMIN — CLOPIDOGREL BISULFATE 600 MILLIGRAM(S): 75 TABLET, FILM COATED ORAL at 12:26

## 2022-07-26 NOTE — PROGRESS NOTE ADULT - SUBJECTIVE AND OBJECTIVE BOX
Interventional Cardiology PA SDA Discharge Note    Patient without complaints. Ambulated and voided without difficulties    Afebrile, VSS    Ext:    		Right   Groin:      no hematoma,    no bruit, dressing; C/D/I    Pulses:    intact DP/PT to baseline     A/P:    51 y/o Male w/ PMHx of HTN, HLD, NIDDM, CAD s/p 3 stents @ [facility] (in 2014) s/p CABG (LIMA to LAD, left radial to OM @ [facility] in 5/2021) presented to his Cardiologist, Dr. Reynolds c/o chest pain and epigastric pain at rest. Denies SOB, palpitations, orthopnea, PND, LE edema. NST 6/7/2022: moderate sized, severe reversible, myocardial perfusion defect of the anterolateral and lateral wall consistent with ischemia in this region. There is a small sized, reversible, myocardial perfusion defect of the anterolateral and lateral wall consistent with ischemia in this region. EF 60%. ECHO per MD note reviewed normal EF without valvular abnormalities.  In light of risk factors, CCS angina class IV symptoms, and abnormal NST result, pt recommended for cardiac angiogram with possible intervention if clinically indicated.    Cardiac cath 7/26/22: (Diagnostic only) LM mild disease, (short vessel), pLAD 95% mLAD 100%  (ISR), pLCx 70% diffuse, OM1 80% (small vessel), mRCA 100%  (with L-R collaterals), Grafts: L radial-OM2 patent, LIMA-LAD patent; LVEDP 5.  -Access: R CFA s/p Perclose; no hematoma/bleeding    Added Amlodipine 5mg QD for anti-anginal management. Plan is to continue Plavix for DAPT given severity of CAD.    1.	Stable for discharge as per attending Dr. Reynolds after bed rest, pt voids, groin/wrist stable and 30 minutes of ambulation.  2.	Follow-up with PMD/Cardiologist Rodolfo in 1-2 weeks  3.	Discharged forms signed and copies in chart

## 2022-08-01 DIAGNOSIS — I25.110 ATHEROSCLEROTIC HEART DISEASE OF NATIVE CORONARY ARTERY WITH UNSTABLE ANGINA PECTORIS: ICD-10-CM

## 2022-08-01 DIAGNOSIS — I10 ESSENTIAL (PRIMARY) HYPERTENSION: ICD-10-CM

## 2022-08-01 DIAGNOSIS — Z95.1 PRESENCE OF AORTOCORONARY BYPASS GRAFT: ICD-10-CM

## 2022-08-01 DIAGNOSIS — I25.82 CHRONIC TOTAL OCCLUSION OF CORONARY ARTERY: ICD-10-CM

## 2022-08-01 DIAGNOSIS — R94.39 ABNORMAL RESULT OF OTHER CARDIOVASCULAR FUNCTION STUDY: ICD-10-CM

## 2023-06-08 NOTE — H&P ADULT - NSHPSOCIALHISTORY_GEN_ALL_CORE
Notification Instructions: Patient will be notified of biopsy results via card or phone. However, patient instructed to call the office if not contacted within 2 weeks. Tobacco: Denies  ETOH: Denies  Illicit: Denies

## 2023-10-03 NOTE — H&P ADULT - MUSCULOSKELETAL
(M6) obeys commands
normal/ROM intact/normal gait/strength 5/5 bilateral upper extremities/strength 5/5 bilateral lower extremities

## 2024-05-11 NOTE — BRIEF OPERATIVE NOTE - ANTIBIOTIC PROTOCOL
Followed protocol
[FreeTextEntry6] : Procedure: Bilateral nasal endoscopy (CPT 27957)   Indication: Anterior rhinoscopy was inadequate to evaluate pathology.  Left: Septum deviated to L Moderate inferior turbinate hypertrophy  Middle meatus clear, without masses, polyps, or pus Sphenoethmoidal recess clear, without masses, polyps, or pus  Right:  Prominent anterior vessels Moderate inferior turbinate hypertrophy Middle meatus clear, without masses, polyps, or pus  Sphenoethmoidal recess clear, without masses, polyps, or pus

## 2024-09-05 NOTE — H&P ADULT - TEMPERATURE IN CELSIUS (DEGREES C)
[Well Developed] : well developed [Well Nourished] : well nourished [No Acute Distress] : no acute distress [Normal Conjunctiva] : normal conjunctiva [Normal Venous Pressure] : normal venous pressure [No Carotid Bruit] : no carotid bruit [Normal S1, S2] : normal S1, S2 [No Rub] : no rub [No Gallop] : no gallop [5th Left ICS - MCL] : palpated at the 5th LICS in the midclavicular line [Normal] : normal [No Precordial Heave] : no precordial heave was noted [Normal Rate] : normal [Rhythm Regular] : regular [Normal S1] : normal S1 [Normal S2] : normal S2 [No Pitting Edema] : no pitting edema present [2+] : left 2+ [No Abnormalities] : the abdominal aorta was not enlarged and no bruit was heard [Clear Lung Fields] : clear lung fields [Good Air Entry] : good air entry [No Respiratory Distress] : no respiratory distress  [Soft] : abdomen soft [Non Tender] : non-tender [No Masses/organomegaly] : no masses/organomegaly [Normal Bowel Sounds] : normal bowel sounds [Normal Gait] : normal gait [No Edema] : no edema [No Cyanosis] : no cyanosis [No Clubbing] : no clubbing [No Varicosities] : no varicosities [No Rash] : no rash [No Skin Lesions] : no skin lesions [Moves all extremities] : moves all extremities [No Focal Deficits] : no focal deficits [Normal Speech] : normal speech [Alert and Oriented] : alert and oriented [Normal memory] : normal memory [I] : a grade 1 [II] : a grade 2 [S3] : no S3 [S4] : no S4 [Right Carotid Bruit] : no bruit heard over the right carotid [Left Carotid Bruit] : no bruit heard over the left carotid 36.8 [Right Femoral Bruit] : no bruit heard over the right femoral artery [Left Femoral Bruit] : no bruit heard over the left femoral artery
